# Patient Record
Sex: FEMALE | Race: WHITE | NOT HISPANIC OR LATINO | URBAN - METROPOLITAN AREA
[De-identification: names, ages, dates, MRNs, and addresses within clinical notes are randomized per-mention and may not be internally consistent; named-entity substitution may affect disease eponyms.]

---

## 2020-05-12 ENCOUNTER — TELEPHONE (OUTPATIENT)
Dept: OBSTETRICS AND GYNECOLOGY | Facility: CLINIC | Age: 25
End: 2020-05-12

## 2020-05-12 DIAGNOSIS — Z36.3 ENCOUNTER FOR ANTENATAL SCREENING FOR MALFORMATION USING ULTRASOUND: Primary | ICD-10-CM

## 2020-05-12 NOTE — TELEPHONE ENCOUNTER
----- Message from Louisa Suh sent at 5/12/2020  4:19 PM CDT -----  Can you have mfm put in orders for anatomy scan          Pt call back [phone]

## 2020-05-12 NOTE — TELEPHONE ENCOUNTER
Patient was contacted and informed orders will be placed once seen in office. Patient expressed understanding.

## 2020-05-20 ENCOUNTER — INITIAL PRENATAL (OUTPATIENT)
Dept: OBSTETRICS AND GYNECOLOGY | Facility: CLINIC | Age: 25
End: 2020-05-20
Payer: MEDICAID

## 2020-05-20 VITALS — SYSTOLIC BLOOD PRESSURE: 116 MMHG | DIASTOLIC BLOOD PRESSURE: 60 MMHG | WEIGHT: 238.13 LBS

## 2020-05-20 DIAGNOSIS — Z34.00 SUPERVISION OF NORMAL FIRST PREGNANCY, ANTEPARTUM: Primary | ICD-10-CM

## 2020-05-20 PROCEDURE — 99999 PR PBB SHADOW E&M-EST. PATIENT-LVL II: ICD-10-PCS | Mod: PBBFAC,,, | Performed by: NURSE PRACTITIONER

## 2020-05-20 PROCEDURE — 99202 OFFICE O/P NEW SF 15 MIN: CPT | Mod: TH,S$PBB,, | Performed by: NURSE PRACTITIONER

## 2020-05-20 PROCEDURE — 99999 PR PBB SHADOW E&M-EST. PATIENT-LVL II: CPT | Mod: PBBFAC,,, | Performed by: NURSE PRACTITIONER

## 2020-05-20 PROCEDURE — 99202 PR OFFICE/OUTPT VISIT, NEW, LEVL II, 15-29 MIN: ICD-10-PCS | Mod: TH,S$PBB,, | Performed by: NURSE PRACTITIONER

## 2020-05-20 PROCEDURE — 99212 OFFICE O/P EST SF 10 MIN: CPT | Mod: PBBFAC,TH | Performed by: NURSE PRACTITIONER

## 2020-05-20 PROCEDURE — 87086 URINE CULTURE/COLONY COUNT: CPT

## 2020-05-20 NOTE — PROGRESS NOTES
Pt presents as a transfer of care from [place]  at 20w0d, with no complaints.  She is a . Prior SAB X 1 with D&C. Denies any chronic medical issues or abdominal surgeries. Reports she had UTI in first trimester.  Reports + FM.  Denies VB and cramping.  Pain, bleeding, and PTL precautions given.  MFM anatomy scan ordered  Enrolled in [employer] [facility].   OB glucose at 24-28 weeks.   Release of records sent to obtain PN records.   F/U in 4 weeks

## 2020-05-22 ENCOUNTER — TELEPHONE (OUTPATIENT)
Dept: OBSTETRICS AND GYNECOLOGY | Facility: CLINIC | Age: 25
End: 2020-05-22

## 2020-05-22 LAB
BACTERIA UR CULT: NORMAL
BACTERIA UR CULT: NORMAL

## 2020-05-22 NOTE — TELEPHONE ENCOUNTER
----- Message from Kermit Hickman, Patient Care Assistant sent at 5/22/2020  2:32 PM CDT -----  Contact: RAO MAY [[id number]]  Name of Who is Calling: RAO MAY [[id number]]    What is the request in detail:Requesting to be scheduled with the mid wives.  Please contact to further discuss and advise      Can the clinic reply by [facility]: No    [facility]   [phone]       
Contacted patient and advised [facility] clinic will contact to schedule appointment.  
(3) slightly limited

## 2020-05-26 ENCOUNTER — PROCEDURE VISIT (OUTPATIENT)
Dept: MATERNAL FETAL MEDICINE | Facility: CLINIC | Age: 25
End: 2020-05-26
Payer: MEDICAID

## 2020-05-26 DIAGNOSIS — Z34.00 SUPERVISION OF NORMAL FIRST PREGNANCY, ANTEPARTUM: ICD-10-CM

## 2020-05-26 PROCEDURE — 76811 OB US DETAILED SNGL FETUS: CPT | Mod: 26,S$PBB,, | Performed by: PEDIATRICS

## 2020-05-26 PROCEDURE — 76811 PR US, OB FETAL EVAL & EXAM, TRANSABDOM,FIRST GESTATION: ICD-10-PCS | Mod: 26,S$PBB,, | Performed by: PEDIATRICS

## 2020-05-26 PROCEDURE — 76811 OB US DETAILED SNGL FETUS: CPT | Mod: PBBFAC | Performed by: PEDIATRICS

## 2020-06-01 ENCOUNTER — INITIAL PRENATAL (OUTPATIENT)
Dept: OBSTETRICS AND GYNECOLOGY | Facility: CLINIC | Age: 25
End: 2020-06-01
Payer: MEDICAID

## 2020-06-01 VITALS
BODY MASS INDEX: 42.52 KG/M2 | HEIGHT: 63 IN | WEIGHT: 240 LBS | SYSTOLIC BLOOD PRESSURE: 124 MMHG | DIASTOLIC BLOOD PRESSURE: 68 MMHG

## 2020-06-01 DIAGNOSIS — E66.09 CLASS 1 OBESITY DUE TO EXCESS CALORIES WITHOUT SERIOUS COMORBIDITY IN ADULT, UNSPECIFIED BMI: ICD-10-CM

## 2020-06-01 DIAGNOSIS — Z34.90 PREGNANCY WITH ONE FETUS, ANTEPARTUM: ICD-10-CM

## 2020-06-01 PROBLEM — E66.9 CLASS 1 OBESITY IN ADULT: Status: ACTIVE | Noted: 2020-06-01

## 2020-06-01 PROCEDURE — 99213 OFFICE O/P EST LOW 20 MIN: CPT | Mod: PBBFAC,TH | Performed by: ADVANCED PRACTICE MIDWIFE

## 2020-06-01 PROCEDURE — 99212 PR OFFICE/OUTPT VISIT, EST, LEVL II, 10-19 MIN: ICD-10-PCS | Mod: TH,S$PBB,, | Performed by: ADVANCED PRACTICE MIDWIFE

## 2020-06-01 PROCEDURE — 99212 OFFICE O/P EST SF 10 MIN: CPT | Mod: TH,S$PBB,, | Performed by: ADVANCED PRACTICE MIDWIFE

## 2020-06-01 PROCEDURE — 99999 PR PBB SHADOW E&M-EST. PATIENT-LVL III: ICD-10-PCS | Mod: PBBFAC,,, | Performed by: ADVANCED PRACTICE MIDWIFE

## 2020-06-01 PROCEDURE — 99999 PR PBB SHADOW E&M-EST. PATIENT-LVL III: CPT | Mod: PBBFAC,,, | Performed by: ADVANCED PRACTICE MIDWIFE

## 2020-06-01 NOTE — PROGRESS NOTES
Chief Complaint   Patient presents with    Initial Prenatal Visit       24 y.o., at 21w5d by Estimated Date of Delivery: 10/7/20 from first U/S    Complaints today: Here alone. Mom lives in town. Doing well.  Discussed diet and weight gain  FOB [place] cruise ship.    TW lbs    ROS  OBSTETRICS:   Contractions No   Bleeding No   Loss of fluid No   Fetal mvmnt yes  GASTRO:   Nausea No   Vomiting No      OB History    Para Term  AB Living   2       1     SAB TAB Ectopic Multiple Live Births   1              # Outcome Date GA Lbr Ish/2nd Weight Sex Delivery Anes PTL Lv   2 Current            1 SAB 2017              Complications: History of D&C       Dating reviewed  Allergies and problem list reviewed and updated  Medical and surgical history reviewed  Prenatal labs reviewed and updated    PHYSICAL EXAM  /68   Wt 108.8 kg (239 lb 15.5 oz)   LMP 2020     GENERAL: No acute distress  HEENT: Normocephalic, atraumatic  NEURO: Alert and oriented x3  PSYCH: Normal mood and affect  PULMONARY: Non-labored respiration; no tachypnea  ABD: Soft, gravid, nontender; no hernia or hepatosplenomegaly  FH = umbilicus    ASSESSMENT AND PLAN    pregnancy Problems (from 20 to present)     No problems associated with this episode.            Reviewed anatomy ultrasound had and will repeat 5wks  Reviewed wt gain parameters for ABC, along with exercise/diet recommendations in pregnancy.  Encouraged prenatal education classes - schedule given.  Education regarding  labor warning s/s.  Confirmed after-hours number given to patient.    Reviewed warning signs, normal FM, and how/when to call.    Follow-up: 4 weeks, call or present sooner PRN

## 2020-06-29 ENCOUNTER — ROUTINE PRENATAL (OUTPATIENT)
Dept: OBSTETRICS AND GYNECOLOGY | Facility: CLINIC | Age: 25
End: 2020-06-29
Payer: MEDICAID

## 2020-06-29 VITALS — DIASTOLIC BLOOD PRESSURE: 80 MMHG | SYSTOLIC BLOOD PRESSURE: 118 MMHG | WEIGHT: 244.38 LBS | BODY MASS INDEX: 43.3 KG/M2

## 2020-06-29 DIAGNOSIS — Z34.90 PREGNANCY WITH ONE FETUS, ANTEPARTUM: ICD-10-CM

## 2020-06-29 PROCEDURE — 99212 OFFICE O/P EST SF 10 MIN: CPT | Mod: TH,S$PBB,, | Performed by: ADVANCED PRACTICE MIDWIFE

## 2020-06-29 PROCEDURE — 99999 PR PBB SHADOW E&M-EST. PATIENT-LVL II: CPT | Mod: PBBFAC,,, | Performed by: ADVANCED PRACTICE MIDWIFE

## 2020-06-29 PROCEDURE — 99999 PR PBB SHADOW E&M-EST. PATIENT-LVL II: ICD-10-PCS | Mod: PBBFAC,,, | Performed by: ADVANCED PRACTICE MIDWIFE

## 2020-06-29 PROCEDURE — 99212 PR OFFICE/OUTPT VISIT, EST, LEVL II, 10-19 MIN: ICD-10-PCS | Mod: TH,S$PBB,, | Performed by: ADVANCED PRACTICE MIDWIFE

## 2020-06-29 PROCEDURE — 99212 OFFICE O/P EST SF 10 MIN: CPT | Mod: PBBFAC,TH | Performed by: ADVANCED PRACTICE MIDWIFE

## 2020-06-29 NOTE — PROGRESS NOTES
No chief complaint on file.      24 y.o. female  at 25w5d, by Estimated Date of Delivery: 10/7/20    Doing well today. Partner should be home in a month - reviewed diet / exercise recs. She is about to start working for [employer] and hoping to increase activity that way.   Reviewed TW lbs    ROS  OBSTETRICS:   Contractions No   Bleeding No   Loss of fluid No   Fetal mvmnt +  GASTRO:   Nausea No   Vomiting No      OB History    Para Term  AB Living   2       1     SAB TAB Ectopic Multiple Live Births   1              # Outcome Date GA Lbr Ish/2nd Weight Sex Delivery Anes PTL Lv   2 Current            1 SAB 2017              Complications: History of D&C       Dating reviewed  Allergies and problem list reviewed and updated  Medical and surgical history reviewed  Prenatal labs reviewed and updated    PHYSICAL EXAM  /80   Wt 110.9 kg (244 lb 6.1 oz)   LMP 2020   BMI 43.30 kg/m²     GENERAL: No acute distress  HEENT: Normocephalic, atraumatic  NEURO: Alert and oriented x3  PSYCH: Normal mood and affect  PULMONARY: Non-labored respiration; no tachypnea  ABD: Soft, gravid, nontender      ASSESSMENT AND PLAN    pregnancy Problems (from 20 to present)     No problems associated with this episode.            Reviewed upcoming 28wk labs, and we don't have records from [facility] of initial OB labs-orders placed  Education regarding warning signs of PreEclampsia, reviewed normal FM,  labor precautions, and how/when to call.    Follow-up: 4 weeks, call or present sooner PRN  Birth Center Risk Assessment: 0- Meets birth center guidelines    0- CNM management in ABC  1- CNM management on L&D  2- Consultation with OB to develop  plan of care  3- Collaborative CNM/OB management with delivery on L&D  Permanent referral of care to MD

## 2020-07-07 ENCOUNTER — LAB VISIT (OUTPATIENT)
Dept: LAB | Facility: OTHER | Age: 25
End: 2020-07-07
Attending: ADVANCED PRACTICE MIDWIFE
Payer: MEDICAID

## 2020-07-07 ENCOUNTER — PROCEDURE VISIT (OUTPATIENT)
Dept: MATERNAL FETAL MEDICINE | Facility: CLINIC | Age: 25
End: 2020-07-07
Payer: MEDICAID

## 2020-07-07 ENCOUNTER — ROUTINE PRENATAL (OUTPATIENT)
Dept: OBSTETRICS AND GYNECOLOGY | Facility: CLINIC | Age: 25
End: 2020-07-07
Payer: MEDICAID

## 2020-07-07 VITALS
WEIGHT: 246.56 LBS | BODY MASS INDEX: 43.69 KG/M2 | SYSTOLIC BLOOD PRESSURE: 126 MMHG | DIASTOLIC BLOOD PRESSURE: 78 MMHG

## 2020-07-07 DIAGNOSIS — Z34.90 PREGNANCY WITH ONE FETUS, ANTEPARTUM: Primary | ICD-10-CM

## 2020-07-07 DIAGNOSIS — Z34.90 PREGNANCY WITH ONE FETUS, ANTEPARTUM: ICD-10-CM

## 2020-07-07 LAB
ABO + RH BLD: NORMAL
BASOPHILS # BLD AUTO: 0.04 K/UL (ref 0–0.2)
BASOPHILS NFR BLD: 0.3 % (ref 0–1.9)
BLD GP AB SCN CELLS X3 SERPL QL: NORMAL
DIFFERENTIAL METHOD: ABNORMAL
EOSINOPHIL # BLD AUTO: 0.1 K/UL (ref 0–0.5)
EOSINOPHIL NFR BLD: 0.6 % (ref 0–8)
ERYTHROCYTE [DISTWIDTH] IN BLOOD BY AUTOMATED COUNT: 13 % (ref 11.5–14.5)
GLUCOSE SERPL-MCNC: 167 MG/DL (ref 70–140)
HCT VFR BLD AUTO: 35 % (ref 37–48.5)
HGB BLD-MCNC: 11.5 G/DL (ref 12–16)
IMM GRANULOCYTES # BLD AUTO: 0.11 K/UL (ref 0–0.04)
IMM GRANULOCYTES NFR BLD AUTO: 1 % (ref 0–0.5)
LYMPHOCYTES # BLD AUTO: 2.2 K/UL (ref 1–4.8)
LYMPHOCYTES NFR BLD: 18.8 % (ref 18–48)
MCH RBC QN AUTO: 29.8 PG (ref 27–31)
MCHC RBC AUTO-ENTMCNC: 32.9 G/DL (ref 32–36)
MCV RBC AUTO: 91 FL (ref 82–98)
MONOCYTES # BLD AUTO: 0.6 K/UL (ref 0.3–1)
MONOCYTES NFR BLD: 5 % (ref 4–15)
NEUTROPHILS # BLD AUTO: 8.5 K/UL (ref 1.8–7.7)
NEUTROPHILS NFR BLD: 74.3 % (ref 38–73)
NRBC BLD-RTO: 0 /100 WBC
PLATELET # BLD AUTO: 388 K/UL (ref 150–350)
PMV BLD AUTO: 10 FL (ref 9.2–12.9)
RBC # BLD AUTO: 3.86 M/UL (ref 4–5.4)
WBC # BLD AUTO: 11.47 K/UL (ref 3.9–12.7)

## 2020-07-07 PROCEDURE — 76816 OB US FOLLOW-UP PER FETUS: CPT | Mod: 26,S$PBB,, | Performed by: OBSTETRICS & GYNECOLOGY

## 2020-07-07 PROCEDURE — 86762 RUBELLA ANTIBODY: CPT

## 2020-07-07 PROCEDURE — 86850 RBC ANTIBODY SCREEN: CPT

## 2020-07-07 PROCEDURE — 76816 OB US FOLLOW-UP PER FETUS: CPT | Mod: PBBFAC | Performed by: OBSTETRICS & GYNECOLOGY

## 2020-07-07 PROCEDURE — 82950 GLUCOSE TEST: CPT

## 2020-07-07 PROCEDURE — 99212 OFFICE O/P EST SF 10 MIN: CPT | Mod: PBBFAC,TH,25 | Performed by: ADVANCED PRACTICE MIDWIFE

## 2020-07-07 PROCEDURE — 86592 SYPHILIS TEST NON-TREP QUAL: CPT

## 2020-07-07 PROCEDURE — 99999 PR PBB SHADOW E&M-EST. PATIENT-LVL II: CPT | Mod: PBBFAC,,, | Performed by: ADVANCED PRACTICE MIDWIFE

## 2020-07-07 PROCEDURE — 99213 PR OFFICE/OUTPT VISIT, EST, LEVL III, 20-29 MIN: ICD-10-PCS | Mod: TH,S$PBB,, | Performed by: ADVANCED PRACTICE MIDWIFE

## 2020-07-07 PROCEDURE — 86703 HIV-1/HIV-2 1 RESULT ANTBDY: CPT

## 2020-07-07 PROCEDURE — 76816 PR  US,PREGNANT UTERUS,F/U,TRANSABD APP: ICD-10-PCS | Mod: 26,S$PBB,, | Performed by: OBSTETRICS & GYNECOLOGY

## 2020-07-07 PROCEDURE — 36415 COLL VENOUS BLD VENIPUNCTURE: CPT

## 2020-07-07 PROCEDURE — 87340 HEPATITIS B SURFACE AG IA: CPT

## 2020-07-07 PROCEDURE — 99213 OFFICE O/P EST LOW 20 MIN: CPT | Mod: TH,S$PBB,, | Performed by: ADVANCED PRACTICE MIDWIFE

## 2020-07-07 PROCEDURE — 99999 PR PBB SHADOW E&M-EST. PATIENT-LVL II: ICD-10-PCS | Mod: PBBFAC,,, | Performed by: ADVANCED PRACTICE MIDWIFE

## 2020-07-07 PROCEDURE — 85025 COMPLETE CBC W/AUTO DIFF WBC: CPT

## 2020-07-07 NOTE — PROGRESS NOTES
Obstetrical ultrasound completed today.  See report in imaging section of [facility].

## 2020-07-07 NOTE — PROGRESS NOTES
No chief complaint on file.      24 y.o. female  at 26w6d, by Estimated Date of Delivery: 10/7/20    Complaints today: Mother here. Doing well today.   Hoping to have FOB at birth, but [place] not [place] the [place]  Will be mother if FOB not here.  Reviewed TW lbs    ROS  OBSTETRICS:   Contractions No   Bleeding No   Loss of fluid No   Fetal mvmnt yes  GASTRO:   Nausea No   Vomiting No      OB History    Para Term  AB Living   2       1     SAB TAB Ectopic Multiple Live Births   1              # Outcome Date GA Lbr Ish/2nd Weight Sex Delivery Anes PTL Lv   2 Current            1 SAB               Complications: History of D&C       Dating reviewed  Allergies and problem list reviewed and updated  Medical and surgical history reviewed  Prenatal labs reviewed and updated    PHYSICAL EXAM  /78   Wt 111.8 kg (246 lb 9.4 oz)   LMP 2020   BMI 43.69 kg/m²     GENERAL: No acute distress  HEENT: Normocephalic, atraumatic  NEURO: Alert and oriented x3  PSYCH: Normal mood and affect  PULMONARY: Non-labored respiration; no tachypnea  ABD: Soft, gravid, nontender      ASSESSMENT AND PLAN    pregnancy Problems (from 20 to present)     No problems associated with this episode.            Reviewed upcoming 28wk labs, (doing today) and orders placed  Education regarding warning signs of PreEclampsia denied today, reviewed normal FM,  labor precautions, and how/when to call.    Follow-up: 4 weeks, call or present sooner PRN

## 2020-07-08 LAB
HBV SURFACE AG SERPL QL IA: NEGATIVE
HIV 1+2 AB+HIV1 P24 AG SERPL QL IA: NEGATIVE
RPR SER QL: NORMAL
RUBV IGG SER-ACNC: 13.8 IU/ML
RUBV IGG SER-IMP: REACTIVE

## 2020-07-10 ENCOUNTER — TELEPHONE (OUTPATIENT)
Dept: OBSTETRICS AND GYNECOLOGY | Facility: HOSPITAL | Age: 25
End: 2020-07-10

## 2020-07-10 DIAGNOSIS — R73.09 ELEVATED GLUCOSE TOLERANCE TEST: Primary | ICD-10-CM

## 2020-07-12 ENCOUNTER — HOSPITAL ENCOUNTER (EMERGENCY)
Facility: OTHER | Age: 25
Discharge: HOME | End: 2020-07-12
Attending: OBSTETRICS & GYNECOLOGY
Payer: MEDICAID

## 2020-07-12 VITALS — HEART RATE: 103 BPM | SYSTOLIC BLOOD PRESSURE: 137 MMHG | DIASTOLIC BLOOD PRESSURE: 71 MMHG | OXYGEN SATURATION: 94 %

## 2020-07-12 DIAGNOSIS — Z3A.27 27 WEEKS GESTATION OF PREGNANCY: ICD-10-CM

## 2020-07-12 DIAGNOSIS — Z03.71 NO LEAKAGE OF AMNIOTIC FLUID INTO VAGINA: Primary | ICD-10-CM

## 2020-07-12 DIAGNOSIS — O16.3 ELEVATED BLOOD PRESSURE AFFECTING PREGNANCY IN THIRD TRIMESTER, ANTEPARTUM: ICD-10-CM

## 2020-07-12 LAB
ALBUMIN SERPL BCP-MCNC: 2.5 G/DL (ref 3.5–5.2)
ALP SERPL-CCNC: 98 U/L (ref 55–135)
ALT SERPL W/O P-5'-P-CCNC: 9 U/L (ref 10–44)
ANION GAP SERPL CALC-SCNC: 10 MMOL/L (ref 8–16)
AST SERPL-CCNC: 16 U/L (ref 10–40)
BASOPHILS # BLD AUTO: 0.04 K/UL (ref 0–0.2)
BASOPHILS NFR BLD: 0.4 % (ref 0–1.9)
BILIRUB SERPL-MCNC: 0.2 MG/DL (ref 0.1–1)
BUN SERPL-MCNC: 7 MG/DL (ref 6–20)
CALCIUM SERPL-MCNC: 8.7 MG/DL (ref 8.7–10.5)
CHLORIDE SERPL-SCNC: 109 MMOL/L (ref 95–110)
CO2 SERPL-SCNC: 19 MMOL/L (ref 23–29)
CREAT SERPL-MCNC: 0.7 MG/DL (ref 0.5–1.4)
CREAT UR-MCNC: 151.4 MG/DL (ref 15–325)
DIFFERENTIAL METHOD: ABNORMAL
EOSINOPHIL # BLD AUTO: 0.1 K/UL (ref 0–0.5)
EOSINOPHIL NFR BLD: 0.6 % (ref 0–8)
ERYTHROCYTE [DISTWIDTH] IN BLOOD BY AUTOMATED COUNT: 12.8 % (ref 11.5–14.5)
EST. GFR  (AFRICAN AMERICAN): >60 ML/MIN/1.73 M^2
EST. GFR  (NON AFRICAN AMERICAN): >60 ML/MIN/1.73 M^2
GLUCOSE SERPL-MCNC: 120 MG/DL (ref 70–110)
HCT VFR BLD AUTO: 33.2 % (ref 37–48.5)
HGB BLD-MCNC: 11.1 G/DL (ref 12–16)
IMM GRANULOCYTES # BLD AUTO: 0.03 K/UL (ref 0–0.04)
IMM GRANULOCYTES NFR BLD AUTO: 0.3 % (ref 0–0.5)
LYMPHOCYTES # BLD AUTO: 1.9 K/UL (ref 1–4.8)
LYMPHOCYTES NFR BLD: 16.7 % (ref 18–48)
MCH RBC QN AUTO: 30.2 PG (ref 27–31)
MCHC RBC AUTO-ENTMCNC: 33.4 G/DL (ref 32–36)
MCV RBC AUTO: 90 FL (ref 82–98)
MONOCYTES # BLD AUTO: 0.7 K/UL (ref 0.3–1)
MONOCYTES NFR BLD: 6.6 % (ref 4–15)
NEUTROPHILS # BLD AUTO: 8.4 K/UL (ref 1.8–7.7)
NEUTROPHILS NFR BLD: 75.4 % (ref 38–73)
NRBC BLD-RTO: 0 /100 WBC
PLATELET # BLD AUTO: 380 K/UL (ref 150–350)
PMV BLD AUTO: 9.9 FL (ref 9.2–12.9)
POTASSIUM SERPL-SCNC: 4.6 MMOL/L (ref 3.5–5.1)
PROT SERPL-MCNC: 6.6 G/DL (ref 6–8.4)
PROT UR-MCNC: 14 MG/DL (ref 0–15)
PROT/CREAT UR: 0.09 MG/G{CREAT} (ref 0–0.2)
RBC # BLD AUTO: 3.68 M/UL (ref 4–5.4)
SODIUM SERPL-SCNC: 138 MMOL/L (ref 136–145)
WBC # BLD AUTO: 11.13 K/UL (ref 3.9–12.7)

## 2020-07-12 PROCEDURE — 59025 FETAL NON-STRESS TEST: CPT | Mod: 26,,, | Performed by: OBSTETRICS & GYNECOLOGY

## 2020-07-12 PROCEDURE — 99284 EMERGENCY DEPT VISIT MOD MDM: CPT | Mod: 25,,, | Performed by: OBSTETRICS & GYNECOLOGY

## 2020-07-12 PROCEDURE — 82570 ASSAY OF URINE CREATININE: CPT

## 2020-07-12 PROCEDURE — 59025 PR FETAL 2N-STRESS TEST: ICD-10-PCS | Mod: 26,,, | Performed by: OBSTETRICS & GYNECOLOGY

## 2020-07-12 PROCEDURE — 85025 COMPLETE CBC W/AUTO DIFF WBC: CPT

## 2020-07-12 PROCEDURE — 99284 PR EMERGENCY DEPT VISIT,LEVEL IV: ICD-10-PCS | Mod: 25,,, | Performed by: OBSTETRICS & GYNECOLOGY

## 2020-07-12 PROCEDURE — 80053 COMPREHEN METABOLIC PANEL: CPT

## 2020-07-12 PROCEDURE — 59025 FETAL NON-STRESS TEST: CPT

## 2020-07-12 PROCEDURE — 99284 EMERGENCY DEPT VISIT MOD MDM: CPT | Mod: 25

## 2020-07-12 NOTE — ED PROVIDER NOTES
Encounter Date: 2020       History     Chief Complaint   Patient presents with    Rupture of Membranes     Elo Hays is a 24 y.o. [id number] at 27w4d presents complaining of LOF.   This IUP is complicated by obesity.  Patient denies contractions, denies vaginal bleeding, denies LOF.   Fetal Movement: normal.      Review of patient's allergies indicates:  No Known Allergies  No past medical history on file.  Past Surgical History:   Procedure Laterality Date    DILATION AND CURETTAGE OF UTERUS  2017     Family History   Problem Relation Age of Onset    Diabetes Paternal Grandfather     Diabetes Paternal Grandmother     Diabetes Maternal Grandfather     Heart attack Maternal Grandfather         X2,  from heart issues    No Known Problems Mother     No Known Problems Father     No Known Problems Brother     No Known Problems Maternal Grandmother      Social History     Tobacco Use    Smoking status: Never Smoker    Smokeless tobacco: Never Used   Substance Use Topics    Alcohol use: Not Currently     Frequency: Never     Comment: ocassionally    Drug use: Never     Review of Systems   Constitutional: Negative for chills and fever.   HENT: Negative for sore throat.    Eyes: Negative for visual disturbance.   Respiratory: Negative for shortness of breath.    Cardiovascular: Negative for chest pain and palpitations.   Gastrointestinal: Negative for diarrhea.   Genitourinary: Positive for vaginal bleeding. Negative for difficulty urinating.   Musculoskeletal: Negative for myalgias.   Skin: Negative for color change.   Neurological: Negative for dizziness, light-headedness and headaches.   Psychiatric/Behavioral: Negative for confusion.   All other systems reviewed and are negative.      Physical Exam     Initial Vitals   BP Pulse Resp Temp SpO2   20 1550 20 1547 -- -- 20 1548   138/85 (!) 119   95 %      MAP       --                Vitals:    20 1627 20 1629 20  1653 07/12/20 1654   BP: (!) 146/77      Pulse: 86 101 99 106   SpO2:    (!) 94%    07/12/20 1657 07/12/20 1658 07/12/20 1700 07/12/20 1701   BP:  130/67     Pulse: 98 94 100 94   SpO2:   (!) 94%     07/12/20 1709 07/12/20 1710 07/12/20 1712 07/12/20 1713   BP:   132/65    Pulse: 99 99 101 (!) 113   SpO2:  95%      07/12/20 1715 07/12/20 1720 07/12/20 1730   BP:   137/71   Pulse: 97 99 103   SpO2: (!) 94% (!) 94% (!) 94%       Physical Exam    Constitutional: She appears well-developed and well-nourished. No distress.   HENT:   Head: Normocephalic and atraumatic.   Eyes: EOM are normal.   Neck: Normal range of motion.   Cardiovascular: Normal rate.   Pulmonary/Chest: She has no wheezes.   Abdominal: Soft. There is no abdominal tenderness.   Musculoskeletal: Normal range of motion.   Neurological: She is alert and oriented to person, place, and time.   Skin: Skin is warm, dry and intact. No rash noted.   Psychiatric: She has a normal mood and affect. Her speech is normal and behavior is normal.     OB LABOR EXAM:   Pre-Term Labor: No.   Membranes ruptured: No.   Method: Sterile vaginal exam per MD and Sterile speculum exam per MD.   Vaginal Bleeding: none present.     Dilatation: 0.   Station: -5.     Amniotic Fluid Color: no fluid.           ED Course   Obtain Fetal nonstress test (NST)    Date/Time: 7/12/2020 4:00 PM  Performed by: Swapnil Rogers MD  Authorized by: Swapnil Rogers MD     Nonstress Test:     Variability:  6-25 BPM    Decelerations:  Variable    Accelerations:  10 bpm    Baseline:  160    Uterine Irritability: No      Contractions:  Not present  Biophysical Profile:     Nonstress Test Interpretation: reactive      Overall Impression:  Reassuring      Labs Reviewed   CBC W/ AUTO DIFFERENTIAL - Abnormal; Notable for the following components:       Result Value    RBC 3.68 (*)     Hemoglobin 11.1 (*)     Hematocrit 33.2 (*)     Platelets 380 (*)     Gran # (ANC) 8.4 (*)     Gran% 75.4 (*)     Lymph%  16.7 (*)     All other components within normal limits   COMPREHENSIVE METABOLIC PANEL - Abnormal; Notable for the following components:    CO2 19 (*)     Glucose 120 (*)     Albumin 2.5 (*)     ALT 9 (*)     All other components within normal limits   PROTEIN / CREATININE RATIO, URINE    Narrative:     Specimen Source->Urine          Imaging Results    None          Medical Decision Making:   ED Management:  23 yo  27 weeks r/o SROM  Denies any Pre E sx  NST reactive, reassuring  No signs of SROM on exam; cervix closed  Pre E labs drawn  Serial BP  Of note pt did fail 1 hour glucose. Alerted pt to contact CNM for 3 hour  Given mild range BP, pt now meets criteria for gHTN  Pt amenable to IOL. Consented, Vertex on US  Will alert CNM              Attending Attestation:   Physician Attestation Statement for Resident:  As the supervising MD   Physician Attestation Statement: I have personally seen and examined this patient.   I agree with the above history. -:   As the supervising MD I agree with the above PE.    As the supervising MD I agree with the above treatment, course, plan, and disposition.   -:   NST  I independently reviewed the fetal non-stress test with the following interpretation:  160 BPM baseline  Variability: moderate  Accelerations: present  Decelerations: absent  Contractions: none  Category 1    Clinical Interpretation:age appropriate    Patient evaluated and found to be stable, agree with resident's assessment of no leakage of amniotic fluid. Baseline PIH labs drawn due to borderline BP and early gestational age and plan to discharge with precautions re s/s  labor and pre-eclampsia, close follow up.  I was personally present during the critical portions of the procedure(s) performed by the resident and was immediately available in the ED to provide services and assistance as needed during the entire procedure.  I have reviewed and agree with the residents interpretation of the following:  lab data.  I have reviewed the following: old records at this facility.                                  Clinical Impression:       ICD-10-CM ICD-9-CM   1. No leakage of amniotic fluid into vagina  Z03.71 V89.01   2. Elevated blood pressure affecting pregnancy in third trimester, antepartum  O16.3 642.33   3. 27 weeks gestation of pregnancy  Z3A.27 V22.2         Disposition:   Disposition: Admitted  Condition: Stable     ED Disposition Condition    Discharge Stable        ED Prescriptions     None        Follow-up Information    None                     LEONCIO Rogers MD  OBGYN PGY2                Lalitha Cueva MD  07/14/20 1104

## 2020-07-12 NOTE — DISCHARGE INSTRUCTIONS
Call L&D at [phone].  Please return note any of the following leakage of fluid, decrease fetal movement, vaginal bleeding and contractions that are 3-5 mins apart lasting for one hour or more.

## 2020-07-28 ENCOUNTER — LAB VISIT (OUTPATIENT)
Dept: LAB | Facility: OTHER | Age: 25
End: 2020-07-28
Attending: ADVANCED PRACTICE MIDWIFE
Payer: MEDICAID

## 2020-07-28 ENCOUNTER — ROUTINE PRENATAL (OUTPATIENT)
Dept: OBSTETRICS AND GYNECOLOGY | Facility: CLINIC | Age: 25
End: 2020-07-28
Payer: MEDICAID

## 2020-07-28 VITALS
SYSTOLIC BLOOD PRESSURE: 120 MMHG | WEIGHT: 251.31 LBS | DIASTOLIC BLOOD PRESSURE: 66 MMHG | BODY MASS INDEX: 44.53 KG/M2

## 2020-07-28 DIAGNOSIS — Z34.90 PREGNANT AND NOT YET DELIVERED, UNSPECIFIED TRIMESTER: Primary | ICD-10-CM

## 2020-07-28 DIAGNOSIS — R73.09 ELEVATED GLUCOSE TOLERANCE TEST: ICD-10-CM

## 2020-07-28 LAB
GLUCOSE SERPL-MCNC: 113 MG/DL
GLUCOSE SERPL-MCNC: 167 MG/DL
GLUCOSE SERPL-MCNC: 169 MG/DL
GLUCOSE SERPL-MCNC: 93 MG/DL (ref 70–110)

## 2020-07-28 PROCEDURE — 99212 OFFICE O/P EST SF 10 MIN: CPT | Mod: TH,S$PBB,, | Performed by: ADVANCED PRACTICE MIDWIFE

## 2020-07-28 PROCEDURE — 99212 PR OFFICE/OUTPT VISIT, EST, LEVL II, 10-19 MIN: ICD-10-PCS | Mod: TH,S$PBB,, | Performed by: ADVANCED PRACTICE MIDWIFE

## 2020-07-28 PROCEDURE — 82952 GTT-ADDED SAMPLES: CPT

## 2020-07-28 PROCEDURE — 36415 COLL VENOUS BLD VENIPUNCTURE: CPT

## 2020-07-28 PROCEDURE — 99999 PR PBB SHADOW E&M-EST. PATIENT-LVL II: ICD-10-PCS | Mod: PBBFAC,,, | Performed by: ADVANCED PRACTICE MIDWIFE

## 2020-07-28 PROCEDURE — 99999 PR PBB SHADOW E&M-EST. PATIENT-LVL II: CPT | Mod: PBBFAC,,, | Performed by: ADVANCED PRACTICE MIDWIFE

## 2020-07-28 PROCEDURE — 99212 OFFICE O/P EST SF 10 MIN: CPT | Mod: PBBFAC,TH | Performed by: ADVANCED PRACTICE MIDWIFE

## 2020-07-28 PROCEDURE — 82951 GLUCOSE TOLERANCE TEST (GTT): CPT

## 2020-07-28 NOTE — PROGRESS NOTES
No chief complaint on file.      24 y.o. female  at 29w6d, by Estimated Date of Delivery: 10/7/20    Doing well today.  Reviewed TW lbs    ROS  OBSTETRICS:   Contractions No   Bleeding No   Loss of fluid No   Fetal mvmnt +  GASTRO:   Nausea No   Vomiting No      OB History    Para Term  AB Living   2       1     SAB TAB Ectopic Multiple Live Births   1              # Outcome Date GA Lbr Ish/2nd Weight Sex Delivery Anes PTL Lv   2 Current            1 2017              Complications: History of D&C       Dating reviewed  Allergies and problem list reviewed and updated  Medical and surgical history reviewed  Prenatal labs reviewed and updated    PHYSICAL EXAM  /66   Wt 114 kg (251 lb 5.2 oz)   LMP 2020   BMI 44.53 kg/m²     GENERAL: No acute distress  HEENT: Normocephalic, atraumatic  NEURO: Alert and oriented x3  PSYCH: Normal mood and affect  PULMONARY: Non-labored respiration; no tachypnea  ABD: Soft, gravid, nontender.      ASSESSMENT AND PLAN    pregnancy Problems (from 20 to present)     Problem Noted Resolved    Elevated glucose tolerance test 7/10/2020 by Rox Smith CNM No    Overview Signed 7/10/2020  5:16 PM by Rox Smith CNM     Ordered 3 hour.     Sent portal message and attempted calling pt -                Reviewed 28wk labs  Pt's partner back in [place] and hoping to join her before birth.   3 hour glucose today - pending.   ED diagnosed pt with gHTN during visit on  - BP normal since that time. Bringing to meeting.     Reviewed warning signs, normal FM,  labor precautions, and how/when to call.    Birth Center Risk Assessment: pending    0- CNM management in ABC  1- CNM management on L&D  2- Consultation with OB to develop  plan of care  3- Collaborative CNM/OB management with delivery on L&D  4- Permanent referral of care to MD      Follow-up: 2 weeks, call or present sooner PRN

## 2020-08-08 ENCOUNTER — TELEPHONE (OUTPATIENT)
Dept: OBSTETRICS AND GYNECOLOGY | Facility: HOSPITAL | Age: 25
End: 2020-08-08

## 2020-08-08 NOTE — TELEPHONE ENCOUNTER
Attempted to call patient back in reply to message re:  Swelling lets.  No answer and unable to leave voice mail.  Message via [place] sent.

## 2020-08-10 ENCOUNTER — DOCUMENTATION ONLY (OUTPATIENT)
Dept: OBSTETRICS AND GYNECOLOGY | Facility: CLINIC | Age: 25
End: 2020-08-10

## 2020-08-10 NOTE — PROGRESS NOTES
Date: 8/10/2020    Reviewed patient medical and obstetrical history with Dr. Arndt.     Patients History is significant for pt had jennifer on her problem list gHTN.  This was noted to on elevated bp in the ED.        Dr. Arndt recommendations for patient:  After reviewing the patient chart, no additional or previous elevated BP noted.  Problem removed from problem list.  Patient remains ABC candidate at this time.

## 2020-08-11 ENCOUNTER — ROUTINE PRENATAL (OUTPATIENT)
Dept: OBSTETRICS AND GYNECOLOGY | Facility: CLINIC | Age: 25
End: 2020-08-11
Payer: MEDICAID

## 2020-08-11 VITALS
BODY MASS INDEX: 44.81 KG/M2 | SYSTOLIC BLOOD PRESSURE: 122 MMHG | WEIGHT: 252.88 LBS | DIASTOLIC BLOOD PRESSURE: 70 MMHG

## 2020-08-11 DIAGNOSIS — Z34.90 PREGNANT AND NOT YET DELIVERED, UNSPECIFIED TRIMESTER: Primary | ICD-10-CM

## 2020-08-11 PROCEDURE — 99999 PR PBB SHADOW E&M-EST. PATIENT-LVL II: CPT | Mod: PBBFAC,,, | Performed by: ADVANCED PRACTICE MIDWIFE

## 2020-08-11 PROCEDURE — 99999 PR PBB SHADOW E&M-EST. PATIENT-LVL II: ICD-10-PCS | Mod: PBBFAC,,, | Performed by: ADVANCED PRACTICE MIDWIFE

## 2020-08-11 PROCEDURE — 99212 OFFICE O/P EST SF 10 MIN: CPT | Mod: PBBFAC,TH | Performed by: ADVANCED PRACTICE MIDWIFE

## 2020-08-11 PROCEDURE — 99213 PR OFFICE/OUTPT VISIT, EST, LEVL III, 20-29 MIN: ICD-10-PCS | Mod: S$PBB,TH,, | Performed by: ADVANCED PRACTICE MIDWIFE

## 2020-08-11 PROCEDURE — 99213 OFFICE O/P EST LOW 20 MIN: CPT | Mod: S$PBB,TH,, | Performed by: ADVANCED PRACTICE MIDWIFE

## 2020-08-11 NOTE — PROGRESS NOTES
Chief Complaint   Patient presents with    Routine Prenatal Visit       24 y.o. female  at 31w6d, by Estimated Date of Delivery: 10/7/20  Mother hx of pre-eclampsia --she is concerned about her swelling today. She is currently not working and states she does sit/ lay around during day sometimes.  Discussed increased risk for pre-e and warning s/s .  Reassured today regarding bp and mild swelling.  Complaints today: swelling. Doing well today.  Reviewed TW lbs--likely to delivery on labor and delivery  Patient ok with this    ROS  OBSTETRICS:   Contractions No   Bleeding No   Loss of fluid No   Fetal mvmnt present  GASTRO:   Nausea No   Vomiting No      OB History    Para Term  AB Living   2       1     SAB TAB Ectopic Multiple Live Births   1              # Outcome Date GA Lbr Ish/2nd Weight Sex Delivery Anes PTL Lv   2 Current            2017              Complications: History of D&C       Dating reviewed  Allergies and problem list reviewed and updated  Medical and surgical history reviewed  Prenatal labs reviewed and updated    PHYSICAL EXAM  /70   Wt 114.7 kg (252 lb 13.9 oz)   LMP 2020   BMI 44.81 kg/m²     GENERAL: No acute distress  HEENT: Normocephalic, atraumatic  NEURO: Alert and oriented x3  PSYCH: Normal mood and affect  PULMONARY: Non-labored respiration; no tachypnea  ABD: Soft, gravid, nontender.      ASSESSMENT AND PLAN    pregnancy Problems (from 20 to present)     Problem Noted Resolved    Elevated glucose tolerance test 7/10/2020 by Rox Smith CNM No    Overview Signed 7/10/2020  5:16 PM by Rox Smith CNM     Ordered 3 hour.     Sent portal message and attempted calling pt -                Patient plans to breast feed - reviewed breast feeding class here.  It's a BOY! They desire circumcision.  [facility]    Reviewed warning signs, normal FM,  labor precautions, and how/when to call.  Confirmed  pt has after-hours number.  Plans for mother to be at delivery--FOB is in [place]/ [facility] [employer] [facility] [employer] [facility] working and he is having trouble getting into [place] at this time with them not being . Patient is coping well with this situation and has good family support.  Follow-up: 2 weeks, call or present sooner ED Felipe CNM, MSN  08/11/2020  9:29 AM

## 2020-08-12 ENCOUNTER — IMMUNIZATION (OUTPATIENT)
Dept: PHARMACY | Facility: CLINIC | Age: 25
End: 2020-08-12
Payer: MEDICAID

## 2020-08-24 ENCOUNTER — ROUTINE PRENATAL (OUTPATIENT)
Dept: OBSTETRICS AND GYNECOLOGY | Facility: CLINIC | Age: 25
End: 2020-08-24
Payer: MEDICAID

## 2020-08-24 VITALS
DIASTOLIC BLOOD PRESSURE: 82 MMHG | BODY MASS INDEX: 45.65 KG/M2 | SYSTOLIC BLOOD PRESSURE: 122 MMHG | WEIGHT: 257.63 LBS

## 2020-08-24 DIAGNOSIS — Z34.90 PREGNANCY, UNSPECIFIED GESTATIONAL AGE: Primary | ICD-10-CM

## 2020-08-24 PROCEDURE — 99212 OFFICE O/P EST SF 10 MIN: CPT | Mod: PBBFAC,TH | Performed by: ADVANCED PRACTICE MIDWIFE

## 2020-08-24 PROCEDURE — 99213 PR OFFICE/OUTPT VISIT, EST, LEVL III, 20-29 MIN: ICD-10-PCS | Mod: TH,S$PBB,, | Performed by: ADVANCED PRACTICE MIDWIFE

## 2020-08-24 PROCEDURE — 99999 PR PBB SHADOW E&M-EST. PATIENT-LVL II: ICD-10-PCS | Mod: PBBFAC,,, | Performed by: ADVANCED PRACTICE MIDWIFE

## 2020-08-24 PROCEDURE — 99213 OFFICE O/P EST LOW 20 MIN: CPT | Mod: TH,S$PBB,, | Performed by: ADVANCED PRACTICE MIDWIFE

## 2020-08-24 PROCEDURE — 99999 PR PBB SHADOW E&M-EST. PATIENT-LVL II: CPT | Mod: PBBFAC,,, | Performed by: ADVANCED PRACTICE MIDWIFE

## 2020-08-24 NOTE — PROGRESS NOTES
Chief Complaint   Patient presents with    Routine Prenatal Visit       24 y.o. female  at 33w5d, by Estimated Date of Delivery: 10/7/20    Complaints today: Mother here. Doing well today.  FOB- [place]  Legs swelling when when laying down, thighs, goes down    Reviewed TW lbs    ROS  OBSTETRICS:   Contractions No   Bleeding No   Loss of fluid No   Fetal mvmnt Yes ++  GASTRO:   Nausea No   Vomiting No      OB History    Para Term  AB Living   2       1     SAB TAB Ectopic Multiple Live Births   1              # Outcome Date GA Lbr Ish/2nd Weight Sex Delivery Anes PTL Lv   2 Current            1  2017              Complications: History of D&C       Dating reviewed  Allergies and problem list reviewed and updated  Medical and surgical history reviewed  Prenatal labs reviewed and updated    PHYSICAL EXAM  /82   Wt 116.8 kg (257 lb 9.7 oz)   LMP 2020   BMI 45.65 kg/m²     GENERAL: No acute distress  HEENT: Normocephalic, atraumatic  NEURO: Alert and oriented x3  PSYCH: Normal mood and affect  PULMONARY: Non-labored respiration; no tachypnea  ABD: Soft, gravid, nontender.      ASSESSMENT AND PLAN    pregnancy Problems (from 20 to present)     Problem Noted Resolved    Elevated glucose tolerance test 7/10/2020 by Rox Smith CNM No    Overview Signed 7/10/2020  5:16 PM by Rox Smith CNM     Ordered 3 hour.     Sent portal message and attempted calling pt -                Reviewed upcoming 36wk labs.   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     Reviewed ABC risk assessment with the patient:    Birth Center Risk Assessment: 1-management on labor and Delivery    0- CNM management in ABC  1- CNM management on L&D  2- Consultation with OB to develop  plan of care  3- Collaborative CNM/OB management with delivery on L&D  4- Permanent referral of care to MD    She understands she is not a candidate for the ABC. Reviewed  potential risks which could arise, that could change this status.    Reviewed warning signs, normal FM,  labor precautions, and how/when to call. Confirmed pt has after-hours number.    Follow-up: 2 weeks, call or present sooner PRN

## 2020-09-01 ENCOUNTER — PROCEDURE VISIT (OUTPATIENT)
Dept: MATERNAL FETAL MEDICINE | Facility: CLINIC | Age: 25
End: 2020-09-01
Payer: MEDICAID

## 2020-09-01 DIAGNOSIS — Z34.90 PREGNANCY, UNSPECIFIED GESTATIONAL AGE: ICD-10-CM

## 2020-09-01 DIAGNOSIS — O99.210 MATERNAL OBESITY AFFECTING PREGNANCY, ANTEPARTUM: ICD-10-CM

## 2020-09-01 PROCEDURE — 76816 OB US FOLLOW-UP PER FETUS: CPT | Mod: 26,S$PBB,, | Performed by: OBSTETRICS & GYNECOLOGY

## 2020-09-01 PROCEDURE — 76819 PR US, OB, FETAL BIOPHYSICAL, W/O NST: ICD-10-PCS | Mod: 26,S$PBB,, | Performed by: OBSTETRICS & GYNECOLOGY

## 2020-09-01 PROCEDURE — 76819 FETAL BIOPHYS PROFIL W/O NST: CPT | Mod: PBBFAC | Performed by: OBSTETRICS & GYNECOLOGY

## 2020-09-01 PROCEDURE — 76816 PR  US,PREGNANT UTERUS,F/U,TRANSABD APP: ICD-10-PCS | Mod: 26,S$PBB,, | Performed by: OBSTETRICS & GYNECOLOGY

## 2020-09-01 PROCEDURE — 76816 OB US FOLLOW-UP PER FETUS: CPT | Mod: PBBFAC | Performed by: OBSTETRICS & GYNECOLOGY

## 2020-09-01 PROCEDURE — 76819 FETAL BIOPHYS PROFIL W/O NST: CPT | Mod: 26,S$PBB,, | Performed by: OBSTETRICS & GYNECOLOGY

## 2020-09-08 ENCOUNTER — ROUTINE PRENATAL (OUTPATIENT)
Dept: OBSTETRICS AND GYNECOLOGY | Facility: CLINIC | Age: 25
End: 2020-09-08
Payer: MEDICAID

## 2020-09-08 ENCOUNTER — LAB VISIT (OUTPATIENT)
Dept: LAB | Facility: OTHER | Age: 25
End: 2020-09-08
Attending: ADVANCED PRACTICE MIDWIFE
Payer: MEDICAID

## 2020-09-08 VITALS
BODY MASS INDEX: 46.31 KG/M2 | SYSTOLIC BLOOD PRESSURE: 120 MMHG | WEIGHT: 261.38 LBS | DIASTOLIC BLOOD PRESSURE: 84 MMHG

## 2020-09-08 DIAGNOSIS — Z34.90 PREGNANCY, UNSPECIFIED GESTATIONAL AGE: ICD-10-CM

## 2020-09-08 DIAGNOSIS — Z34.83 ENCOUNTER FOR SUPERVISION OF OTHER NORMAL PREGNANCY IN THIRD TRIMESTER: Primary | ICD-10-CM

## 2020-09-08 PROCEDURE — 86592 SYPHILIS TEST NON-TREP QUAL: CPT

## 2020-09-08 PROCEDURE — 99999 PR PBB SHADOW E&M-EST. PATIENT-LVL II: ICD-10-PCS | Mod: PBBFAC,,, | Performed by: ADVANCED PRACTICE MIDWIFE

## 2020-09-08 PROCEDURE — 99212 OFFICE O/P EST SF 10 MIN: CPT | Mod: PBBFAC,TH | Performed by: ADVANCED PRACTICE MIDWIFE

## 2020-09-08 PROCEDURE — 87081 CULTURE SCREEN ONLY: CPT

## 2020-09-08 PROCEDURE — 86703 HIV-1/HIV-2 1 RESULT ANTBDY: CPT

## 2020-09-08 PROCEDURE — 99213 PR OFFICE/OUTPT VISIT, EST, LEVL III, 20-29 MIN: ICD-10-PCS | Mod: S$PBB,TH,, | Performed by: ADVANCED PRACTICE MIDWIFE

## 2020-09-08 PROCEDURE — 99213 OFFICE O/P EST LOW 20 MIN: CPT | Mod: S$PBB,TH,, | Performed by: ADVANCED PRACTICE MIDWIFE

## 2020-09-08 PROCEDURE — 36415 COLL VENOUS BLD VENIPUNCTURE: CPT

## 2020-09-08 PROCEDURE — 99999 PR PBB SHADOW E&M-EST. PATIENT-LVL II: CPT | Mod: PBBFAC,,, | Performed by: ADVANCED PRACTICE MIDWIFE

## 2020-09-08 NOTE — PROGRESS NOTES
No chief complaint on file.      24 y.o. female  at 35w6d, by Estimated Date of Delivery: 10/7/20    Complaints today: daily BH . Doing well today.  Reviewed TW lbs    ROS  OBSTETRICS:   Contractions No   Bleeding No   Loss of fluid No   Fetal mvmnt yes  GASTRO:   Nausea No   Vomiting No      OB History    Para Term  AB Living   2       1     SAB TAB Ectopic Multiple Live Births   1              # Outcome Date GA Lbr Ish/2nd Weight Sex Delivery Anes PTL Lv   2 Current            1 2017              Complications: History of D&C       Dating reviewed  Allergies and problem list reviewed and updated  Medical and surgical history reviewed  Prenatal labs reviewed and updated    PHYSICAL EXAM  /84   Wt 118.6 kg (261 lb 5.7 oz)   LMP 2020   BMI 46.31 kg/m²     GENERAL: No acute distress  HEENT: Normocephalic, atraumatic  NEURO: Alert and oriented x3  PSYCH: Normal mood and affect  PULMONARY: Non-labored respiration; no tachypnea  ABD: Soft, gravid, nontender.      ASSESSMENT AND PLAN    pregnancy Problems (from 20 to present)     Problem Noted Resolved    Elevated glucose tolerance test 7/10/2020 by Rox Smith CNM No    Overview Signed 7/10/2020  5:16 PM by Rox Smith CNM     Ordered 3 hour.     Sent portal message and attempted calling pt -                GBS collected today   Reviewed [place] department of Health recommendation for repeat HIV/RPR today; she agrees      Reviewed warning signs, normal FM,  labor precautions, and how/when to call.      Follow-up: 1 week, call or present sooner PRN

## 2020-09-09 LAB
HIV 1+2 AB+HIV1 P24 AG SERPL QL IA: NEGATIVE
RPR SER QL: NORMAL

## 2020-09-10 LAB — BACTERIA SPEC AEROBE CULT: NORMAL

## 2020-09-11 ENCOUNTER — NURSE TRIAGE (OUTPATIENT)
Dept: ADMINISTRATIVE | Facility: CLINIC | Age: 25
End: 2020-09-11

## 2020-09-11 NOTE — TELEPHONE ENCOUNTER
"36 weeks pregnant and slight annoying back pain 1-2/10. Washita medina the past few days. Underwear were wet not sure if water is trickling out. A lot of mucus when she wiped. Sometimes abdominal tensing when nadira medina occur. Once a day and they has slowly started amping up a little bit. Pt stated the leakage has been more than 4 hours and she is still not sure if it is leakage but she had a lot of mucous when she wiped. Care advice recommend pt go to LD now. Pt verbalized understanding.     Reason for Disposition   Pregnant > 36 weeks (i.e., term) and having contractions or other symptoms of labor   Leakage of fluid from vagina and leakage started > 4 hours ago    Additional Information   Negative: Passed out (i.e., fainted, collapsed and was not responding)   Negative: Shock suspected (e.g., cold/pale/clammy skin, too weak to stand, low BP, rapid pulse)   Negative: Difficult to awaken or acting confused (e.g., disoriented, slurred speech)   Negative: SEVERE constant abdominal pain (e.g., excruciating) and present > 1 hour   Negative: SEVERE bleeding (e.g., continuous red blood from vagina, or large blood clots)   Negative: Uncontrollable urge to push (i.e., feels like baby is coming out now)   Negative: Umbilical cord hanging out of the vagina (shiny, white, curled appearance, "like telephone cord")   Negative: Can see baby   Negative: Sounds like a life-threatening emergency to the triager   Negative: MODERATE-SEVERE abdominal pain   Negative: First baby (primipara) with contractions < 6 minutes apart, and present 2 hours   Negative: History of prior delivery (multipara) with contractions < 10 minutes apart, and present 1 hour   Negative: History of rapid prior delivery with contractions < 10 minutes apart   Negative: Leakage of fluid from vagina and green or brown in color    Protocols used: PREGNANCY - VAGINAL CIDOIPDVL-W-DE, PREGNANCY - LABOR-A-OH      "

## 2020-09-15 ENCOUNTER — HOSPITAL ENCOUNTER (INPATIENT)
Facility: OTHER | Age: 25
LOS: 3 days | Discharge: HOME | End: 2020-09-18
Attending: OBSTETRICS & GYNECOLOGY | Admitting: OBSTETRICS & GYNECOLOGY
Payer: MEDICAID

## 2020-09-15 ENCOUNTER — HOSPITAL ENCOUNTER (OUTPATIENT)
Dept: PERINATAL CARE | Facility: OTHER | Age: 25
Discharge: HOME | End: 2020-09-15
Attending: ADVANCED PRACTICE MIDWIFE
Payer: MEDICAID

## 2020-09-15 ENCOUNTER — ANESTHESIA (OUTPATIENT)
Dept: OBSTETRICS AND GYNECOLOGY | Facility: OTHER | Age: 25
End: 2020-09-15
Payer: MEDICAID

## 2020-09-15 ENCOUNTER — ANESTHESIA EVENT (OUTPATIENT)
Dept: OBSTETRICS AND GYNECOLOGY | Facility: OTHER | Age: 25
End: 2020-09-15
Payer: MEDICAID

## 2020-09-15 ENCOUNTER — ROUTINE PRENATAL (OUTPATIENT)
Dept: OBSTETRICS AND GYNECOLOGY | Facility: CLINIC | Age: 25
End: 2020-09-15
Payer: MEDICAID

## 2020-09-15 ENCOUNTER — TELEPHONE (OUTPATIENT)
Dept: OBSTETRICS AND GYNECOLOGY | Facility: HOSPITAL | Age: 25
End: 2020-09-15

## 2020-09-15 VITALS
DIASTOLIC BLOOD PRESSURE: 82 MMHG | WEIGHT: 263.25 LBS | SYSTOLIC BLOOD PRESSURE: 142 MMHG | BODY MASS INDEX: 46.64 KG/M2

## 2020-09-15 DIAGNOSIS — O16.3 ELEVATED BLOOD PRESSURE AFFECTING PREGNANCY IN THIRD TRIMESTER, ANTEPARTUM: ICD-10-CM

## 2020-09-15 DIAGNOSIS — Z98.891 S/P CESAREAN SECTION: ICD-10-CM

## 2020-09-15 DIAGNOSIS — O13.3 GESTATIONAL HYPERTENSION, THIRD TRIMESTER: Primary | ICD-10-CM

## 2020-09-15 DIAGNOSIS — Z34.90 ENCOUNTER FOR INDUCTION OF LABOR: ICD-10-CM

## 2020-09-15 DIAGNOSIS — O16.3 ELEVATED BLOOD PRESSURE AFFECTING PREGNANCY IN THIRD TRIMESTER, ANTEPARTUM: Primary | ICD-10-CM

## 2020-09-15 DIAGNOSIS — Z34.90 PREGNANCY WITH ONE FETUS, ANTEPARTUM: Primary | ICD-10-CM

## 2020-09-15 DIAGNOSIS — Z3A.36 36 WEEKS GESTATION OF PREGNANCY: ICD-10-CM

## 2020-09-15 LAB
ABO + RH BLD: NORMAL
BASOPHILS # BLD AUTO: 0.05 K/UL (ref 0–0.2)
BASOPHILS NFR BLD: 0.3 % (ref 0–1.9)
BLD GP AB SCN CELLS X3 SERPL QL: NORMAL
CREAT UR-MCNC: 110.7 MG/DL (ref 15–325)
CREAT UR-MCNC: 91 MG/DL (ref 15–325)
DIFFERENTIAL METHOD: ABNORMAL
EOSINOPHIL # BLD AUTO: 0 K/UL (ref 0–0.5)
EOSINOPHIL NFR BLD: 0.3 % (ref 0–8)
ERYTHROCYTE [DISTWIDTH] IN BLOOD BY AUTOMATED COUNT: 12.9 % (ref 11.5–14.5)
HCT VFR BLD AUTO: 33.1 % (ref 37–48.5)
HGB BLD-MCNC: 10.9 G/DL (ref 12–16)
IMM GRANULOCYTES # BLD AUTO: 0.07 K/UL (ref 0–0.04)
IMM GRANULOCYTES NFR BLD AUTO: 0.4 % (ref 0–0.5)
LYMPHOCYTES # BLD AUTO: 3.1 K/UL (ref 1–4.8)
LYMPHOCYTES NFR BLD: 19.5 % (ref 18–48)
MCH RBC QN AUTO: 28.5 PG (ref 27–31)
MCHC RBC AUTO-ENTMCNC: 32.9 G/DL (ref 32–36)
MCV RBC AUTO: 87 FL (ref 82–98)
MONOCYTES # BLD AUTO: 1.2 K/UL (ref 0.3–1)
MONOCYTES NFR BLD: 7.3 % (ref 4–15)
NEUTROPHILS # BLD AUTO: 11.5 K/UL (ref 1.8–7.7)
NEUTROPHILS NFR BLD: 72.2 % (ref 38–73)
NRBC BLD-RTO: 0 /100 WBC
PLATELET # BLD AUTO: 428 K/UL (ref 150–350)
PMV BLD AUTO: 10.3 FL (ref 9.2–12.9)
PROT UR-MCNC: 12 MG/DL (ref 0–15)
PROT UR-MCNC: 22 MG/DL (ref 0–15)
PROT/CREAT UR: 0.13 MG/G{CREAT} (ref 0–0.2)
PROT/CREAT UR: 0.2 MG/G{CREAT} (ref 0–0.2)
RBC # BLD AUTO: 3.82 M/UL (ref 4–5.4)
SARS-COV-2 RDRP RESP QL NAA+PROBE: NEGATIVE
WBC # BLD AUTO: 15.93 K/UL (ref 3.9–12.7)

## 2020-09-15 PROCEDURE — 11000001 HC ACUTE MED/SURG PRIVATE ROOM

## 2020-09-15 PROCEDURE — 86901 BLOOD TYPING SEROLOGIC RH(D): CPT

## 2020-09-15 PROCEDURE — 99213 OFFICE O/P EST LOW 20 MIN: CPT | Mod: 25,TH,S$PBB, | Performed by: ADVANCED PRACTICE MIDWIFE

## 2020-09-15 PROCEDURE — 72100002 HC LABOR CARE, 1ST 8 HOURS

## 2020-09-15 PROCEDURE — 99213 PR OFFICE/OUTPT VISIT, EST, LEVL III, 20-29 MIN: ICD-10-PCS | Mod: 25,TH,S$PBB, | Performed by: ADVANCED PRACTICE MIDWIFE

## 2020-09-15 PROCEDURE — 85025 COMPLETE CBC W/AUTO DIFF WBC: CPT | Mod: 91

## 2020-09-15 PROCEDURE — 99283 EMERGENCY DEPT VISIT LOW MDM: CPT | Mod: 25,,, | Performed by: ADVANCED PRACTICE MIDWIFE

## 2020-09-15 PROCEDURE — U0002 COVID-19 LAB TEST NON-CDC: HCPCS

## 2020-09-15 PROCEDURE — 59025 FETAL NON-STRESS TEST: CPT | Mod: 26,,, | Performed by: OBSTETRICS & GYNECOLOGY

## 2020-09-15 PROCEDURE — 76815 OB US LIMITED FETUS(S): CPT

## 2020-09-15 PROCEDURE — 63600175 PHARM REV CODE 636 W HCPCS: Performed by: ADVANCED PRACTICE MIDWIFE

## 2020-09-15 PROCEDURE — 76815 OB US LIMITED FETUS(S): CPT | Mod: 26,,, | Performed by: OBSTETRICS & GYNECOLOGY

## 2020-09-15 PROCEDURE — 99999 PR PBB SHADOW E&M-EST. PATIENT-LVL II: ICD-10-PCS | Mod: PBBFAC,,, | Performed by: ADVANCED PRACTICE MIDWIFE

## 2020-09-15 PROCEDURE — 59025 FETAL NON-STRESS TEST: CPT | Mod: 26,,, | Performed by: ADVANCED PRACTICE MIDWIFE

## 2020-09-15 PROCEDURE — 87491 CHLMYD TRACH DNA AMP PROBE: CPT

## 2020-09-15 PROCEDURE — 59025 PR FETAL 2N-STRESS TEST: ICD-10-PCS | Mod: 26,,, | Performed by: OBSTETRICS & GYNECOLOGY

## 2020-09-15 PROCEDURE — 99285 EMERGENCY DEPT VISIT HI MDM: CPT | Mod: 25,27

## 2020-09-15 PROCEDURE — 59200 INSERT CERVICAL DILATOR: CPT

## 2020-09-15 PROCEDURE — 84156 ASSAY OF PROTEIN URINE: CPT | Mod: 91

## 2020-09-15 PROCEDURE — 84156 ASSAY OF PROTEIN URINE: CPT

## 2020-09-15 PROCEDURE — 76815 PR  US,PREGNANT UTERUS,LIMITED, 1/> FETUSES: ICD-10-PCS | Mod: 26,,, | Performed by: OBSTETRICS & GYNECOLOGY

## 2020-09-15 PROCEDURE — 99212 OFFICE O/P EST SF 10 MIN: CPT | Mod: PBBFAC,TH,25 | Performed by: ADVANCED PRACTICE MIDWIFE

## 2020-09-15 PROCEDURE — 59025 FETAL NON-STRESS TEST: CPT | Mod: 76

## 2020-09-15 PROCEDURE — 99283 PR EMERGENCY DEPT VISIT,LEVEL III: ICD-10-PCS | Mod: 25,,, | Performed by: ADVANCED PRACTICE MIDWIFE

## 2020-09-15 PROCEDURE — 59025 OBTAIN FETAL NONSTRESS TEST (NST): ICD-10-PCS | Mod: 26,,, | Performed by: ADVANCED PRACTICE MIDWIFE

## 2020-09-15 PROCEDURE — 99999 PR PBB SHADOW E&M-EST. PATIENT-LVL II: CPT | Mod: PBBFAC,,, | Performed by: ADVANCED PRACTICE MIDWIFE

## 2020-09-15 PROCEDURE — 59025 FETAL NON-STRESS TEST: CPT

## 2020-09-15 RX ORDER — OXYTOCIN/RINGER'S LACTATE 30/500 ML
2 PLASTIC BAG, INJECTION (ML) INTRAVENOUS CONTINUOUS
Status: DISCONTINUED | OUTPATIENT
Start: 2020-09-15 | End: 2020-09-16

## 2020-09-15 RX ORDER — SODIUM CHLORIDE 9 MG/ML
INJECTION, SOLUTION INTRAVENOUS
Status: DISCONTINUED | OUTPATIENT
Start: 2020-09-15 | End: 2020-09-16

## 2020-09-15 RX ORDER — OXYTOCIN/RINGER'S LACTATE 30/500 ML
95 PLASTIC BAG, INJECTION (ML) INTRAVENOUS ONCE
Status: DISCONTINUED | OUTPATIENT
Start: 2020-09-15 | End: 2020-09-16

## 2020-09-15 RX ORDER — BUTORPHANOL TARTRATE 1 MG/ML
1 INJECTION INTRAMUSCULAR; INTRAVENOUS ONCE
Status: COMPLETED | OUTPATIENT
Start: 2020-09-15 | End: 2020-09-15

## 2020-09-15 RX ORDER — ONDANSETRON 8 MG/1
8 TABLET, ORALLY DISINTEGRATING ORAL EVERY 8 HOURS PRN
Status: DISCONTINUED | OUTPATIENT
Start: 2020-09-15 | End: 2020-09-16

## 2020-09-15 RX ORDER — SIMETHICONE 80 MG
1 TABLET,CHEWABLE ORAL 4 TIMES DAILY PRN
Status: DISCONTINUED | OUTPATIENT
Start: 2020-09-15 | End: 2020-09-16

## 2020-09-15 RX ORDER — CEFAZOLIN SODIUM 1 G/50ML
SOLUTION INTRAVENOUS
Status: DISPENSED
Start: 2020-09-15 | End: 2020-09-16

## 2020-09-15 RX ORDER — OXYTOCIN/RINGER'S LACTATE 30/500 ML
334 PLASTIC BAG, INJECTION (ML) INTRAVENOUS ONCE
Status: DISCONTINUED | OUTPATIENT
Start: 2020-09-15 | End: 2020-09-16

## 2020-09-15 RX ORDER — CALCIUM CARBONATE 200(500)MG
500 TABLET,CHEWABLE ORAL 3 TIMES DAILY PRN
Status: DISCONTINUED | OUTPATIENT
Start: 2020-09-15 | End: 2020-09-16

## 2020-09-15 RX ORDER — SODIUM CHLORIDE, SODIUM LACTATE, POTASSIUM CHLORIDE, CALCIUM CHLORIDE 600; 310; 30; 20 MG/100ML; MG/100ML; MG/100ML; MG/100ML
INJECTION, SOLUTION INTRAVENOUS CONTINUOUS
Status: DISCONTINUED | OUTPATIENT
Start: 2020-09-15 | End: 2020-09-16

## 2020-09-15 RX ADMIN — Medication 2 MILLI-UNITS/MIN: at 06:09

## 2020-09-15 RX ADMIN — SODIUM CHLORIDE, SODIUM LACTATE, POTASSIUM CHLORIDE, AND CALCIUM CHLORIDE: .6; .31; .03; .02 INJECTION, SOLUTION INTRAVENOUS at 06:09

## 2020-09-15 RX ADMIN — BUTORPHANOL TARTRATE 1 MG: 1 INJECTION, SOLUTION INTRAMUSCULAR; INTRAVENOUS at 06:09

## 2020-09-15 NOTE — PROGRESS NOTES
No chief complaint on file.      24 y.o. female  at 36w6d, by Estimated Date of Delivery: 10/7/20    Complaints today: Mother here. Doing well today.  Has headaches random brief sinus, not even daily. No visual changes, or epigastric  Initial /82, repeat after 15 min. 144/86  Pre-E labs ordered stat, labs in normal range  Discussed client with Dr Kearns. He wanted an NST today and U/S end of the week. Both ordered and client informed.  Updated A Champagne CNM, on call midwife to f/u     Reviewed TW lbs    ROS  OBSTETRICS:   Contractions No   Bleeding No   Loss of fluid No   Fetal mvmnt yes  GASTRO:   Nausea No   Vomiting No      OB History    Para Term  AB Living   2       1     SAB TAB Ectopic Multiple Live Births   1              # Outcome Date GA Lbr Ish/2nd Weight Sex Delivery Anes PTL Lv   2 Current            1 2017              Complications: History of D&C       Dating reviewed  Allergies and problem list reviewed and updated  Medical and surgical history reviewed  Prenatal labs reviewed and updated    PHYSICAL EXAM  LMP 2020     GENERAL: No acute distress  HEENT: Normocephalic, atraumatic  NEURO: Alert and oriented x3  PSYCH: Normal mood and affect  PULMONARY: Non-labored respiration; no tachypnea  ABD: Soft, gravid, nontender.      ASSESSMENT AND PLAN    pregnancy Problems (from 18 to present)     Problem Noted Resolved    Elevated glucose tolerance test 7/10/2020 by Rox Smith CNM No    Overview Signed 7/10/2020  5:16 PM by Rox Smith CNM     Ordered 3 hour.     Sent portal message and attempted calling pt -                GBS neg  Reviewed [place] department of [facility] recommendation for repeat HIV/RPR neg/neg       Reviewed warning signs, normal FM,  labor precautions, and how/when to call.      Follow-up: 1 week, call or present sooner PRN

## 2020-09-15 NOTE — ANESTHESIA PREPROCEDURE EVALUATION
[facility]  Anesthesia Pre-Operative Evaluation         Patient Name: Elo Hays  YOB: 1995  MRN: [id number]    09/15/2020      Elo Hays is a 24 y.o. female  @ 36w6d who presents to [facility] with elevated blood pressure in PNT. Denies headache, SOB, RUQ pain, edema, vision changes. States that she has been feeling contractions for some time now, but that they are getting stronger and closer together. Pregnancy complicated by elevated 1 hr glucose (normal 3 hour) and gHTN. Other PMH includes obesity (BMI 47), previous D&C, and prior SAB. Patient is interested in epidural for delivery. She denies hx of stroke, seizures, CAD, CHF, HTN, asthma, COPD, GERD, dysuria, bleeding disorders, anticoagulant use, spinal disorders, or back surgeries.     OB History    Para Term  AB Living   2       1     SAB TAB Ectopic Multiple Live Births   1              # Outcome Date GA Lbr Ish/2nd Weight Sex Delivery Anes PTL Lv   2 Current            1 SAB               Complications: History of D&C       Review of patient's allergies indicates:  No Known Allergies    Wt Readings from Last 1 Encounters:   09/15/20 0829 119.4 kg (263 lb 3.7 oz)       BP Readings from Last 3 Encounters:   09/15/20 (!) 117/56   09/15/20 (!) 142/82   20 120/84       Patient Active Problem List   Diagnosis    Pregnancy with one fetus, antepartum    Class 1 obesity in adult    Elevated glucose tolerance test    Gestational hypertension, third trimester       Past Surgical History:   Procedure Laterality Date    DILATION AND CURETTAGE OF UTERUS  2017       Social History     Socioeconomic History    Marital status: Single     Spouse name: SLIM Persaud    Number of children: 0    Years of education: 15    Highest education level: High school graduate   Occupational History    Occupation: [employer]     Comment: currently out of work   Social Needs    Financial resource  strain: Not hard at all    Food insecurity     Worry: Never true     Inability: Never true    Transportation needs     Medical: No     Non-medical: No   Tobacco Use    Smoking status: Never Smoker    Smokeless tobacco: Never Used   Substance and Sexual Activity    Alcohol use: Not Currently     Frequency: Never     Comment: ocassionally    Drug use: Never    Sexual activity: Yes     Partners: Male   Lifestyle    Physical activity     Days per week: 2 days     Minutes per session: 30 min    Stress: Only a little   Relationships    Social connections     Talks on phone: Not on file     Gets together: Not on file     Attends Restoration service: Not on file     Active member of club or organization: Not on file     Attends meetings of clubs or organizations: Not on file     Relationship status: Not on file   Other Topics Concern    Not on file   Social History Narrative    FOB in from [place], met on ship, 7 months ( 6/1/2020- today )         Chemistry        Component Value Date/Time     09/15/2020 0921    K 4.2 09/15/2020 0921     09/15/2020 0921    CO2 17 (L) 09/15/2020 0921    BUN 6 09/15/2020 0921    CREATININE 0.7 09/15/2020 0921     (H) 09/15/2020 0921        Component Value Date/Time    CALCIUM 9.4 09/15/2020 0921    ALKPHOS 150 (H) 09/15/2020 0921    AST 12 09/15/2020 0921    ALT 10 09/15/2020 0921    BILITOT 0.2 09/15/2020 0921    ESTGFRAFRICA >60 09/15/2020 0921    EGFRNONAA >60 09/15/2020 0921            Lab Results   Component Value Date    WBC 13.57 (H) 09/15/2020    HGB 11.3 (L) 09/15/2020    HCT 33.6 (L) 09/15/2020    MCV 86 09/15/2020     (H) 09/15/2020       No results for input(s): PT, INR, PROTIME, APTT in the last 72 hours.      Anesthesia Evaluation    I have reviewed the Patient Summary Reports.      I have reviewed the Medications.     Review of Systems  Anesthesia Hx:  No problems with previous Anesthesia  Denies Family Hx of Anesthesia complications.    Denies Personal Hx of Anesthesia complications.   Social:  Non-Smoker    Hematology/Oncology:  Hematology Normal   Oncology Normal     EENT/Dental:EENT/Dental Normal   Cardiovascular:   Hypertension (gestational)    Pulmonary:  Pulmonary Normal    Renal/:  Renal/ Normal     Hepatic/GI:  Hepatic/GI Normal    Neurological:  Neurology Normal    Endocrine:  Endocrine Normal    Psych:  Psychiatric Normal           Physical Exam  General:  Well nourished, Morbid Obesity    Airway/Jaw/Neck:  Airway Findings: Mouth Opening: Normal Tongue: Normal  General Airway Assessment: Adult  Mallampati: III  Jaw/Neck Findings:  Neck ROM: Normal ROM      Dental:  Dental Findings: In tact   Chest/Lungs:  Chest/Lungs Findings: Clear to auscultation, Normal Respiratory Rate     Heart/Vascular:  Heart Findings: Rate: Normal  Rhythm: Regular Rhythm     Abdomen:  Abdomen Findings:  Normal     Musculoskeletal:  Musculoskeletal Findings: Normal   Skin:  Skin Findings: Normal    Mental Status:  Mental Status Findings:  Cooperative, Alert and Oriented         Anesthesia Plan  Type of Anesthesia, risks & benefits discussed:  Anesthesia Type:  epidural, general, spinal, CSE  Patient's Preference:   Intra-op Monitoring Plan: standard ASA monitors  Intra-op Monitoring Plan Comments:   Post Op Pain Control Plan: multimodal analgesia and per primary service following discharge from PACU  Post Op Pain Control Plan Comments:   Induction:   IV  Beta Blocker:  Patient is not currently on a Beta-Blocker (No further documentation required).       Informed Consent: Patient understands risks and agrees with Anesthesia plan.  Questions answered. Anesthesia consent signed with patient.  ASA Score: 2     Day of Surgery Review of History & Physical: I have interviewed and examined the patient. I have reviewed the patient's H&P dated:    H&P update referred to the provider.         Ready For Surgery From Anesthesia Perspective.

## 2020-09-15 NOTE — PROGRESS NOTES
[place] balloon placed per JANIS Land. 30 cc's sterile saline inflated into port. Patient tolerated well.

## 2020-09-15 NOTE — ED PROVIDER NOTES
Encounter Date: 9/15/2020       History     Chief Complaint   Patient presents with    Hypertension     HPI   Elo Hays is a 24 y.o. [id number] at 36w6d presents with elevated blood pressure in PNT. Denies headache, SOB, RUQ pain, edema, vision changes. States that she has been feeling contractions for some time now, but that they are getting stronger and closer together.  This IUP is complicated by elevated 1 hr (normal 3 hour) and gHTN. Patient reports contractions, denies vaginal bleeding, denies LOF.   Fetal Movement: normal.       Review of patient's allergies indicates:  No Known Allergies  No past medical history on file.  Past Surgical History:   Procedure Laterality Date    DILATION AND CURETTAGE OF UTERUS  2017     Family History   Problem Relation Age of Onset    Diabetes Paternal Grandfather     Diabetes Paternal Grandmother     Diabetes Maternal Grandfather     Heart attack Maternal Grandfather         X2,  from heart issues    No Known Problems Mother     No Known Problems Father     No Known Problems Brother     No Known Problems Maternal Grandmother      Social History     Tobacco Use    Smoking status: Never Smoker    Smokeless tobacco: Never Used   Substance Use Topics    Alcohol use: Not Currently     Frequency: Never     Comment: ocassionally    Drug use: Never     Review of Systems   Constitutional: Negative for fever.   HENT: Negative for sore throat.    Respiratory: Negative for shortness of breath.    Cardiovascular: Negative for chest pain.   Gastrointestinal: Negative for nausea.   Genitourinary: Negative for dysuria.   Musculoskeletal: Negative for back pain.   Skin: Negative for rash.   Neurological: Negative for weakness.   Hematological: Does not bruise/bleed easily.       Physical Exam     Initial Vitals [09/15/20 1436]   BP Pulse Resp Temp SpO2   (!) 142/80 97 -- -- 98 %      MAP       --         Physical Exam    Vitals reviewed.  Constitutional: She appears  well-developed and well-nourished. She is not diaphoretic. No distress.   HENT:   Head: Normocephalic and atraumatic.   Eyes: Conjunctivae and EOM are normal.   Neck: Normal range of motion. Neck supple.   Cardiovascular: Normal rate.   Pulmonary/Chest:   Normal work of breathing   Abdominal: Soft. There is no abdominal tenderness. There is no rebound and no guarding.   Gravid uterus   Musculoskeletal: Normal range of motion.   Neurological: She is alert and oriented to person, place, and time.   Skin: Skin is warm and dry.   Psychiatric: She has a normal mood and affect. Thought content normal.     OB LABOR EXAM:     Membranes ruptured: No.   Method: Sterile vaginal exam per MD.       Dilatation: 1.   Station: -3.   Effacement: 60%.             ED Course   Obtain Fetal nonstress test (NST)    Date/Time: 9/15/2020 3:33 PM  Performed by: Anais Loco MD  Authorized by: Anais Loco MD     Nonstress Test:     Variability:  6-25 BPM    Decelerations:  Late    Accelerations:  15 bpm    Acoustic Stimulator: No      Baseline:  150    Uterine Irritability: Yes      Contractions:  Irregular    Contraction Frequency:  Q2-5 mins  Biophysical Profile:     Nonstress Test Interpretation: reactive        Labs Reviewed   PROTEIN / CREATININE RATIO, URINE   SARS-COV-2 RNA AMPLIFICATION, QUAL          Imaging Results    None          Medical Decision Making:   ED Management:  - mild range blood pressures  - CBC, CMP drawn this AM wnl  - UPC pending  - [place]: ctx q2-5 mins  - FHT: reactive, recurrent late decels, moderate variability  - Admit to Labor and Delivery unit  - Consents for delivery including vaginal or  section and blood transfusion signed and to chart  - Risks, benefits, alternatives and possible complications have been discussed in detail with the patient.   - Epidural per Anesthesia  - Draw CBC, T&S  - Notify midwife  - Post-Partum Hemorrhage risk - low  1550 /-3 soft, posterior, cephalic  Discussed pt  case with Dr. Montalvo. Will admit to L&D for IOL d/t cat 2 fetal heart rate tracing and gestational HTN (protein/creatinine ratio not resulted yet).                               Clinical Impression:   Final diagnoses:  [O13.3] Gestational hypertension, third trimester (Primary)  [Z3A.36] 36 weeks gestation of pregnancy

## 2020-09-15 NOTE — TELEPHONE ENCOUNTER
Spoke with Elo's mother and let her know that Elo has an appointment at the prenatal testing clinic at 1pm today. Gave directions to the clinic, and she states she will let Elo know and she will come with her to that appointment.

## 2020-09-16 PROBLEM — Z34.90 ENCOUNTER FOR INDUCTION OF LABOR: Status: RESOLVED | Noted: 2020-09-15 | Resolved: 2020-09-16

## 2020-09-16 PROBLEM — Z98.891 S/P CESAREAN SECTION: Status: ACTIVE | Noted: 2020-09-16

## 2020-09-16 PROCEDURE — C1751 CATH, INF, PER/CENT/MIDLINE: HCPCS | Performed by: ANESTHESIOLOGY

## 2020-09-16 PROCEDURE — 63600175 PHARM REV CODE 636 W HCPCS: Performed by: ANESTHESIOLOGY

## 2020-09-16 PROCEDURE — 59514 PR CESAREAN DELIVERY ONLY: ICD-10-PCS | Mod: AT,,, | Performed by: OBSTETRICS & GYNECOLOGY

## 2020-09-16 PROCEDURE — 71000033 HC RECOVERY, INTIAL HOUR: Performed by: OBSTETRICS & GYNECOLOGY

## 2020-09-16 PROCEDURE — 63600175 PHARM REV CODE 636 W HCPCS

## 2020-09-16 PROCEDURE — S0020 INJECTION, BUPIVICAINE HYDRO: HCPCS | Performed by: UROLOGY

## 2020-09-16 PROCEDURE — 62326 NJX INTERLAMINAR LMBR/SAC: CPT | Performed by: STUDENT IN AN ORGANIZED HEALTH CARE EDUCATION/TRAINING PROGRAM

## 2020-09-16 PROCEDURE — 71000039 HC RECOVERY, EACH ADD'L HOUR: Performed by: OBSTETRICS & GYNECOLOGY

## 2020-09-16 PROCEDURE — 25000003 PHARM REV CODE 250: Performed by: UROLOGY

## 2020-09-16 PROCEDURE — 63600175 PHARM REV CODE 636 W HCPCS: Performed by: ADVANCED PRACTICE MIDWIFE

## 2020-09-16 PROCEDURE — 63600175 PHARM REV CODE 636 W HCPCS: Performed by: STUDENT IN AN ORGANIZED HEALTH CARE EDUCATION/TRAINING PROGRAM

## 2020-09-16 PROCEDURE — S0028 INJECTION, FAMOTIDINE, 20 MG: HCPCS | Performed by: ANESTHESIOLOGY

## 2020-09-16 PROCEDURE — 25000003 PHARM REV CODE 250: Performed by: ANESTHESIOLOGY

## 2020-09-16 PROCEDURE — 62326 NJX INTERLAMINAR LMBR/SAC: CPT | Performed by: UROLOGY

## 2020-09-16 PROCEDURE — 36004724 HC OB OR TIME LEV III - 1ST 15 MIN: Performed by: OBSTETRICS & GYNECOLOGY

## 2020-09-16 PROCEDURE — 27200710 HC EPIDURAL INFUSION PUMP SET: Performed by: ANESTHESIOLOGY

## 2020-09-16 PROCEDURE — 01968 PR INSERT CATH,ART,PERCUT,SHORTTERM: ICD-10-PCS | Mod: AA,,, | Performed by: ANESTHESIOLOGY

## 2020-09-16 PROCEDURE — 72100003 HC LABOR CARE, EA. ADDL. 8 HRS

## 2020-09-16 PROCEDURE — 25000003 PHARM REV CODE 250: Performed by: ADVANCED PRACTICE MIDWIFE

## 2020-09-16 PROCEDURE — 51702 INSERT TEMP BLADDER CATH: CPT

## 2020-09-16 PROCEDURE — 59514 CESAREAN DELIVERY ONLY: CPT | Mod: AT,,, | Performed by: OBSTETRICS & GYNECOLOGY

## 2020-09-16 PROCEDURE — 11000001 HC ACUTE MED/SURG PRIVATE ROOM

## 2020-09-16 PROCEDURE — 37000009 HC ANESTHESIA EA ADD 15 MINS: Performed by: OBSTETRICS & GYNECOLOGY

## 2020-09-16 PROCEDURE — 37000008 HC ANESTHESIA 1ST 15 MINUTES: Performed by: OBSTETRICS & GYNECOLOGY

## 2020-09-16 PROCEDURE — 25000003 PHARM REV CODE 250: Performed by: STUDENT IN AN ORGANIZED HEALTH CARE EDUCATION/TRAINING PROGRAM

## 2020-09-16 PROCEDURE — 59514 CESAREAN DELIVERY ONLY: CPT | Mod: AA,,, | Performed by: ANESTHESIOLOGY

## 2020-09-16 PROCEDURE — 59514 PRA REAN DELIVERY ONLY: ICD-10-PCS | Mod: AA,,, | Performed by: ANESTHESIOLOGY

## 2020-09-16 PROCEDURE — 01968 ANES/ANALG CS DLVR NEURAXIAL: CPT | Mod: AA,,, | Performed by: ANESTHESIOLOGY

## 2020-09-16 PROCEDURE — 36004725 HC OB OR TIME LEV III - EA ADD 15 MIN: Performed by: OBSTETRICS & GYNECOLOGY

## 2020-09-16 RX ORDER — LIDOCAINE HCL/EPINEPHRINE/PF 2%-1:200K
VIAL (ML) INJECTION
Status: DISCONTINUED | OUTPATIENT
Start: 2020-09-16 | End: 2020-09-16

## 2020-09-16 RX ORDER — KETOROLAC TROMETHAMINE 30 MG/ML
30 INJECTION, SOLUTION INTRAMUSCULAR; INTRAVENOUS EVERY 6 HOURS
Status: DISCONTINUED | OUTPATIENT
Start: 2020-09-16 | End: 2020-09-16

## 2020-09-16 RX ORDER — DEXAMETHASONE SODIUM PHOSPHATE 4 MG/ML
INJECTION, SOLUTION INTRA-ARTICULAR; INTRALESIONAL; INTRAMUSCULAR; INTRAVENOUS; SOFT TISSUE
Status: DISCONTINUED | OUTPATIENT
Start: 2020-09-16 | End: 2020-09-16

## 2020-09-16 RX ORDER — ACETAMINOPHEN 10 MG/ML
INJECTION, SOLUTION INTRAVENOUS
Status: DISCONTINUED | OUTPATIENT
Start: 2020-09-16 | End: 2020-09-16

## 2020-09-16 RX ORDER — HYDROCORTISONE 25 MG/G
CREAM TOPICAL 3 TIMES DAILY PRN
Status: DISCONTINUED | OUTPATIENT
Start: 2020-09-16 | End: 2020-09-18 | Stop reason: HOSPADM

## 2020-09-16 RX ORDER — FENTANYL/BUPIVACAINE/NS/PF 2MCG/ML-.1
PLASTIC BAG, INJECTION (ML) INJECTION
Status: COMPLETED
Start: 2020-09-16 | End: 2020-09-16

## 2020-09-16 RX ORDER — SODIUM CITRATE AND CITRIC ACID MONOHYDRATE 334; 500 MG/5ML; MG/5ML
30 SOLUTION ORAL ONCE
Status: COMPLETED | OUTPATIENT
Start: 2020-09-16 | End: 2020-09-16

## 2020-09-16 RX ORDER — OXYTOCIN/RINGER'S LACTATE 30/500 ML
95 PLASTIC BAG, INJECTION (ML) INTRAVENOUS ONCE
Status: DISCONTINUED | OUTPATIENT
Start: 2020-09-16 | End: 2020-09-18 | Stop reason: HOSPADM

## 2020-09-16 RX ORDER — OXYCODONE HYDROCHLORIDE 5 MG/1
5 TABLET ORAL EVERY 4 HOURS PRN
Status: DISCONTINUED | OUTPATIENT
Start: 2020-09-16 | End: 2020-09-16

## 2020-09-16 RX ORDER — IBUPROFEN 600 MG/1
600 TABLET ORAL EVERY 6 HOURS
Status: DISCONTINUED | OUTPATIENT
Start: 2020-09-17 | End: 2020-09-18 | Stop reason: HOSPADM

## 2020-09-16 RX ORDER — FENTANYL/BUPIVACAINE/NS/PF 2MCG/ML-.1
PLASTIC BAG, INJECTION (ML) INJECTION CONTINUOUS
Status: DISCONTINUED | OUTPATIENT
Start: 2020-09-16 | End: 2020-09-17 | Stop reason: ALTCHOICE

## 2020-09-16 RX ORDER — PROMETHAZINE HYDROCHLORIDE 25 MG/ML
INJECTION, SOLUTION INTRAMUSCULAR; INTRAVENOUS
Status: DISCONTINUED | OUTPATIENT
Start: 2020-09-16 | End: 2020-09-16

## 2020-09-16 RX ORDER — MORPHINE SULFATE 0.5 MG/ML
INJECTION, SOLUTION EPIDURAL; INTRATHECAL; INTRAVENOUS
Status: DISCONTINUED | OUTPATIENT
Start: 2020-09-16 | End: 2020-09-16

## 2020-09-16 RX ORDER — LIDOCAINE HYDROCHLORIDE AND EPINEPHRINE 15; 5 MG/ML; UG/ML
INJECTION, SOLUTION EPIDURAL
Status: DISCONTINUED | OUTPATIENT
Start: 2020-09-16 | End: 2020-09-16

## 2020-09-16 RX ORDER — DIPHENHYDRAMINE HCL 25 MG
25 CAPSULE ORAL EVERY 4 HOURS PRN
Status: DISCONTINUED | OUTPATIENT
Start: 2020-09-16 | End: 2020-09-18 | Stop reason: HOSPADM

## 2020-09-16 RX ORDER — OXYCODONE AND ACETAMINOPHEN 10; 325 MG/1; MG/1
1 TABLET ORAL EVERY 4 HOURS PRN
Status: DISCONTINUED | OUTPATIENT
Start: 2020-09-16 | End: 2020-09-18 | Stop reason: HOSPADM

## 2020-09-16 RX ORDER — BUPIVACAINE HYDROCHLORIDE 2.5 MG/ML
INJECTION, SOLUTION EPIDURAL; INFILTRATION; INTRACAUDAL
Status: DISPENSED
Start: 2020-09-16 | End: 2020-09-17

## 2020-09-16 RX ORDER — PHENYLEPHRINE HYDROCHLORIDE 10 MG/ML
INJECTION INTRAVENOUS
Status: DISCONTINUED | OUTPATIENT
Start: 2020-09-16 | End: 2020-09-16

## 2020-09-16 RX ORDER — CEFAZOLIN SODIUM 1 G/3ML
INJECTION, POWDER, FOR SOLUTION INTRAMUSCULAR; INTRAVENOUS
Status: DISCONTINUED | OUTPATIENT
Start: 2020-09-16 | End: 2020-09-16

## 2020-09-16 RX ORDER — CARBOPROST TROMETHAMINE 250 UG/ML
250 INJECTION, SOLUTION INTRAMUSCULAR
Status: DISCONTINUED | OUTPATIENT
Start: 2020-09-16 | End: 2020-09-18 | Stop reason: HOSPADM

## 2020-09-16 RX ORDER — MUPIROCIN 20 MG/G
1 OINTMENT TOPICAL 2 TIMES DAILY
Status: DISCONTINUED | OUTPATIENT
Start: 2020-09-16 | End: 2020-09-18 | Stop reason: HOSPADM

## 2020-09-16 RX ORDER — EPHEDRINE SULFATE 50 MG/ML
INJECTION, SOLUTION INTRAVENOUS
Status: DISCONTINUED | OUTPATIENT
Start: 2020-09-16 | End: 2020-09-16

## 2020-09-16 RX ORDER — ONDANSETRON 2 MG/ML
4 INJECTION INTRAMUSCULAR; INTRAVENOUS EVERY 12 HOURS PRN
Status: DISCONTINUED | OUTPATIENT
Start: 2020-09-16 | End: 2020-09-18 | Stop reason: HOSPADM

## 2020-09-16 RX ORDER — FENTANYL CITRATE 50 UG/ML
INJECTION, SOLUTION INTRAMUSCULAR; INTRAVENOUS
Status: DISCONTINUED | OUTPATIENT
Start: 2020-09-16 | End: 2020-09-16

## 2020-09-16 RX ORDER — ONDANSETRON 2 MG/ML
INJECTION INTRAMUSCULAR; INTRAVENOUS
Status: DISCONTINUED | OUTPATIENT
Start: 2020-09-16 | End: 2020-09-16

## 2020-09-16 RX ORDER — ONDANSETRON 8 MG/1
8 TABLET, ORALLY DISINTEGRATING ORAL EVERY 8 HOURS PRN
Status: DISCONTINUED | OUTPATIENT
Start: 2020-09-16 | End: 2020-09-18 | Stop reason: HOSPADM

## 2020-09-16 RX ORDER — BUPIVACAINE HYDROCHLORIDE 2.5 MG/ML
INJECTION, SOLUTION INFILTRATION; PERINEURAL
Status: DISCONTINUED | OUTPATIENT
Start: 2020-09-16 | End: 2020-09-16

## 2020-09-16 RX ORDER — DIPHENHYDRAMINE HCL 25 MG
25 CAPSULE ORAL ONCE
Status: COMPLETED | OUTPATIENT
Start: 2020-09-16 | End: 2020-09-16

## 2020-09-16 RX ORDER — SODIUM CHLORIDE, SODIUM LACTATE, POTASSIUM CHLORIDE, CALCIUM CHLORIDE 600; 310; 30; 20 MG/100ML; MG/100ML; MG/100ML; MG/100ML
INJECTION, SOLUTION INTRAVENOUS CONTINUOUS PRN
Status: DISCONTINUED | OUTPATIENT
Start: 2020-09-16 | End: 2020-09-16

## 2020-09-16 RX ORDER — OXYCODONE AND ACETAMINOPHEN 5; 325 MG/1; MG/1
1 TABLET ORAL EVERY 4 HOURS PRN
Status: DISCONTINUED | OUTPATIENT
Start: 2020-09-16 | End: 2020-09-18 | Stop reason: HOSPADM

## 2020-09-16 RX ORDER — AMOXICILLIN 250 MG
1 CAPSULE ORAL NIGHTLY PRN
Status: DISCONTINUED | OUTPATIENT
Start: 2020-09-16 | End: 2020-09-18 | Stop reason: HOSPADM

## 2020-09-16 RX ORDER — NALOXONE HCL 0.4 MG/ML
0.04 VIAL (ML) INJECTION
Status: DISCONTINUED | OUTPATIENT
Start: 2020-09-16 | End: 2020-09-18 | Stop reason: HOSPADM

## 2020-09-16 RX ORDER — PRENATAL WITH FERROUS FUM AND FOLIC ACID 3080; 920; 120; 400; 22; 1.84; 3; 20; 10; 1; 12; 200; 27; 25; 2 [IU]/1; [IU]/1; MG/1; [IU]/1; MG/1; MG/1; MG/1; MG/1; MG/1; MG/1; UG/1; MG/1; MG/1; MG/1; MG/1
1 TABLET ORAL DAILY
Status: DISCONTINUED | OUTPATIENT
Start: 2020-09-17 | End: 2020-09-18 | Stop reason: HOSPADM

## 2020-09-16 RX ORDER — KETOROLAC TROMETHAMINE 30 MG/ML
30 INJECTION, SOLUTION INTRAMUSCULAR; INTRAVENOUS EVERY 6 HOURS
Status: DISCONTINUED | OUTPATIENT
Start: 2020-09-17 | End: 2020-09-16

## 2020-09-16 RX ORDER — BISACODYL 10 MG
10 SUPPOSITORY, RECTAL RECTAL ONCE AS NEEDED
Status: DISCONTINUED | OUTPATIENT
Start: 2020-09-16 | End: 2020-09-18 | Stop reason: HOSPADM

## 2020-09-16 RX ORDER — OXYTOCIN 10 [USP'U]/ML
INJECTION, SOLUTION INTRAMUSCULAR; INTRAVENOUS
Status: DISCONTINUED | OUTPATIENT
Start: 2020-09-16 | End: 2020-09-16

## 2020-09-16 RX ORDER — FENTANYL CITRATE 50 UG/ML
INJECTION, SOLUTION INTRAMUSCULAR; INTRAVENOUS
Status: COMPLETED
Start: 2020-09-16 | End: 2020-09-16

## 2020-09-16 RX ORDER — OXYTOCIN/RINGER'S LACTATE 30/500 ML
95 PLASTIC BAG, INJECTION (ML) INTRAVENOUS ONCE
Status: DISCONTINUED | OUTPATIENT
Start: 2020-09-16 | End: 2020-09-16

## 2020-09-16 RX ORDER — ACETAMINOPHEN 325 MG/1
650 TABLET ORAL EVERY 6 HOURS SCHEDULED
Status: COMPLETED | OUTPATIENT
Start: 2020-09-16 | End: 2020-09-17

## 2020-09-16 RX ORDER — FAMOTIDINE 10 MG/ML
20 INJECTION INTRAVENOUS ONCE
Status: COMPLETED | OUTPATIENT
Start: 2020-09-16 | End: 2020-09-16

## 2020-09-16 RX ORDER — SODIUM BICARBONATE 1 MEQ/ML
SYRINGE (ML) INTRAVENOUS
Status: DISCONTINUED | OUTPATIENT
Start: 2020-09-16 | End: 2020-09-16

## 2020-09-16 RX ORDER — DOCUSATE SODIUM 100 MG/1
200 CAPSULE, LIQUID FILLED ORAL 2 TIMES DAILY
Status: DISCONTINUED | OUTPATIENT
Start: 2020-09-16 | End: 2020-09-18 | Stop reason: HOSPADM

## 2020-09-16 RX ORDER — ADHESIVE BANDAGE
30 BANDAGE TOPICAL 2 TIMES DAILY PRN
Status: DISCONTINUED | OUTPATIENT
Start: 2020-09-17 | End: 2020-09-18 | Stop reason: HOSPADM

## 2020-09-16 RX ORDER — SODIUM CHLORIDE, SODIUM LACTATE, POTASSIUM CHLORIDE, CALCIUM CHLORIDE 600; 310; 30; 20 MG/100ML; MG/100ML; MG/100ML; MG/100ML
INJECTION, SOLUTION INTRAVENOUS CONTINUOUS
Status: ACTIVE | OUTPATIENT
Start: 2020-09-16 | End: 2020-09-17

## 2020-09-16 RX ORDER — CEFAZOLIN SODIUM 2 G/50ML
2 SOLUTION INTRAVENOUS ONCE
Status: DISCONTINUED | OUTPATIENT
Start: 2020-09-16 | End: 2020-09-16

## 2020-09-16 RX ORDER — KETOROLAC TROMETHAMINE 30 MG/ML
30 INJECTION, SOLUTION INTRAMUSCULAR; INTRAVENOUS EVERY 6 HOURS
Status: DISPENSED | OUTPATIENT
Start: 2020-09-16 | End: 2020-09-17

## 2020-09-16 RX ORDER — BUPIVACAINE HYDROCHLORIDE 2.5 MG/ML
INJECTION, SOLUTION EPIDURAL; INFILTRATION; INTRACAUDAL
Status: DISPENSED
Start: 2020-09-16 | End: 2020-09-16

## 2020-09-16 RX ORDER — MISOPROSTOL 200 UG/1
800 TABLET ORAL
Status: DISCONTINUED | OUTPATIENT
Start: 2020-09-16 | End: 2020-09-18 | Stop reason: HOSPADM

## 2020-09-16 RX ORDER — SIMETHICONE 80 MG
1 TABLET,CHEWABLE ORAL EVERY 6 HOURS PRN
Status: DISCONTINUED | OUTPATIENT
Start: 2020-09-16 | End: 2020-09-18 | Stop reason: HOSPADM

## 2020-09-16 RX ORDER — METHYLERGONOVINE MALEATE 0.2 MG/ML
200 INJECTION INTRAVENOUS
Status: DISCONTINUED | OUTPATIENT
Start: 2020-09-16 | End: 2020-09-18 | Stop reason: HOSPADM

## 2020-09-16 RX ORDER — FENTANYL/BUPIVACAINE/NS/PF 2MCG/ML-.1
PLASTIC BAG, INJECTION (ML) INJECTION CONTINUOUS PRN
Status: DISCONTINUED | OUTPATIENT
Start: 2020-09-16 | End: 2020-09-16

## 2020-09-16 RX ORDER — OXYCODONE HYDROCHLORIDE 5 MG/1
10 TABLET ORAL EVERY 4 HOURS PRN
Status: DISCONTINUED | OUTPATIENT
Start: 2020-09-16 | End: 2020-09-16

## 2020-09-16 RX ORDER — KETOROLAC TROMETHAMINE 30 MG/ML
INJECTION, SOLUTION INTRAMUSCULAR; INTRAVENOUS
Status: COMPLETED
Start: 2020-09-16 | End: 2020-09-16

## 2020-09-16 RX ADMIN — KETOROLAC TROMETHAMINE 30 MG: 30 INJECTION, SOLUTION INTRAMUSCULAR; INTRAVENOUS at 06:09

## 2020-09-16 RX ADMIN — FENTANYL CITRATE 100 MCG: 50 INJECTION, SOLUTION INTRAMUSCULAR; INTRAVENOUS at 02:09

## 2020-09-16 RX ADMIN — SODIUM BICARBONATE 2 MEQ: 84 INJECTION, SOLUTION INTRAVENOUS at 04:09

## 2020-09-16 RX ADMIN — Medication 10 ML/HR: at 02:09

## 2020-09-16 RX ADMIN — Medication 5 ML: at 02:09

## 2020-09-16 RX ADMIN — AZITHROMYCIN MONOHYDRATE 500 MG: 500 INJECTION, POWDER, LYOPHILIZED, FOR SOLUTION INTRAVENOUS at 04:09

## 2020-09-16 RX ADMIN — LIDOCAINE HYDROCHLORIDE,EPINEPHRINE BITARTRATE 3 ML: 15; .005 INJECTION, SOLUTION EPIDURAL; INFILTRATION; INTRACAUDAL; PERINEURAL at 03:09

## 2020-09-16 RX ADMIN — ACETAMINOPHEN 1000 MG: 10 INJECTION, SOLUTION INTRAVENOUS at 05:09

## 2020-09-16 RX ADMIN — PROMETHAZINE HYDROCHLORIDE 6.25 MG: 25 INJECTION INTRAMUSCULAR; INTRAVENOUS at 04:09

## 2020-09-16 RX ADMIN — PHENYLEPHRINE HYDROCHLORIDE 100 MCG: 10 INJECTION INTRAVENOUS at 04:09

## 2020-09-16 RX ADMIN — SODIUM CITRATE AND CITRIC ACID MONOHYDRATE 30 ML: 500; 334 SOLUTION ORAL at 04:09

## 2020-09-16 RX ADMIN — KETOROLAC TROMETHAMINE 30 MG: 30 INJECTION, SOLUTION INTRAMUSCULAR at 06:09

## 2020-09-16 RX ADMIN — PHENYLEPHRINE HYDROCHLORIDE 200 MCG: 10 INJECTION INTRAVENOUS at 03:09

## 2020-09-16 RX ADMIN — EPHEDRINE SULFATE 25 MG: 50 INJECTION INTRAVENOUS at 03:09

## 2020-09-16 RX ADMIN — OXYTOCIN 20 UNITS: 10 INJECTION, SOLUTION INTRAMUSCULAR; INTRAVENOUS at 05:09

## 2020-09-16 RX ADMIN — Medication 1.5 MG: at 05:09

## 2020-09-16 RX ADMIN — FAMOTIDINE 20 MG: 10 INJECTION INTRAVENOUS at 04:09

## 2020-09-16 RX ADMIN — BUPIVACAINE HYDROCHLORIDE 5 ML: 2.5 INJECTION, SOLUTION INFILTRATION; PERINEURAL at 03:09

## 2020-09-16 RX ADMIN — DIPHENHYDRAMINE HYDROCHLORIDE 25 MG: 25 CAPSULE ORAL at 06:09

## 2020-09-16 RX ADMIN — KETOROLAC TROMETHAMINE 30 MG: 30 INJECTION, SOLUTION INTRAMUSCULAR at 11:09

## 2020-09-16 RX ADMIN — CEFAZOLIN 2 G: 330 INJECTION, POWDER, FOR SOLUTION INTRAMUSCULAR; INTRAVENOUS at 04:09

## 2020-09-16 RX ADMIN — DEXAMETHASONE SODIUM PHOSPHATE 4 MG: 4 INJECTION, SOLUTION INTRAMUSCULAR; INTRAVENOUS at 04:09

## 2020-09-16 RX ADMIN — ACETAMINOPHEN 650 MG: 325 TABLET, FILM COATED ORAL at 11:09

## 2020-09-16 RX ADMIN — LIDOCAINE HYDROCHLORIDE,EPINEPHRINE BITARTRATE 3 ML: 15; .005 INJECTION, SOLUTION EPIDURAL; INFILTRATION; INTRACAUDAL; PERINEURAL at 02:09

## 2020-09-16 RX ADMIN — SODIUM CHLORIDE, SODIUM LACTATE, POTASSIUM CHLORIDE, AND CALCIUM CHLORIDE: .6; .31; .03; .02 INJECTION, SOLUTION INTRAVENOUS at 03:09

## 2020-09-16 RX ADMIN — PHENYLEPHRINE HYDROCHLORIDE 200 MCG: 10 INJECTION INTRAVENOUS at 02:09

## 2020-09-16 RX ADMIN — SODIUM CHLORIDE, SODIUM LACTATE, POTASSIUM CHLORIDE, AND CALCIUM CHLORIDE: 600; 310; 30; 20 INJECTION, SOLUTION INTRAVENOUS at 04:09

## 2020-09-16 RX ADMIN — ONDANSETRON HYDROCHLORIDE 4 MG: 2 INJECTION INTRAMUSCULAR; INTRAVENOUS at 04:09

## 2020-09-16 RX ADMIN — DIPHENHYDRAMINE HYDROCHLORIDE 25 MG: 25 CAPSULE ORAL at 11:09

## 2020-09-16 RX ADMIN — SODIUM CHLORIDE, SODIUM LACTATE, POTASSIUM CHLORIDE, AND CALCIUM CHLORIDE 1000 ML: .6; .31; .03; .02 INJECTION, SOLUTION INTRAVENOUS at 02:09

## 2020-09-16 RX ADMIN — FENTANYL CITRATE 100 MCG: 50 INJECTION, SOLUTION INTRAMUSCULAR; INTRAVENOUS at 04:09

## 2020-09-16 RX ADMIN — DOCUSATE SODIUM 200 MG: 100 CAPSULE, LIQUID FILLED ORAL at 09:09

## 2020-09-16 RX ADMIN — LIDOCAINE HYDROCHLORIDE,EPINEPHRINE BITARTRATE 5 ML: 20; .005 INJECTION, SOLUTION EPIDURAL; INFILTRATION; INTRACAUDAL; PERINEURAL at 04:09

## 2020-09-16 RX ADMIN — SODIUM CHLORIDE, SODIUM LACTATE, POTASSIUM CHLORIDE, AND CALCIUM CHLORIDE: .6; .31; .03; .02 INJECTION, SOLUTION INTRAVENOUS at 01:09

## 2020-09-16 NOTE — PROGRESS NOTES
09/16/20 1510   TeleStork Hang Note - Strip   Strip Reviewed by Hang Nurse? Yes   TeleStork Hang Note - Communication   [place] Nurse Communicated with Bedside Nurse Regarding: Fetal Status   TeleStork Hang Note - Notification   Nurse Notified? Yes  (nurses at bedside)   Name of Nurse marcelle ZAYAS

## 2020-09-16 NOTE — PROGRESS NOTES
"LABOR NOTE    S:  Pt resting comfortably with epidural, no complaints now following epidural replacement (following unwitnessed epidural disconnection).  Pt's mother at bedside and supportive.     O: /78   Pulse (!) 111   Temp 98 °F (36.7 °C)   Resp 18   Ht 5' 3" (1.6 m)   Wt 119.4 kg (263 lb 3.7 oz)   LMP 2020   SpO2 98%   Breastfeeding No   BMI 46.63 kg/m²     GENERAL: Calm and appropriate affect  NEURO: Alert, oriented, normal speech  ABDOMEN: Nontender, Fundus palpates soft between UC's.  FHT: Baseline 155, moderate BTBV, positive accels, variable decels. Cat 2.  CTX: q 3-5 minutes  SVE: 4/70/-4 (bollatable)      ASSESSMENT:   24 y.o.  IUP at 37w0d, Cat 2    Patient Active Problem List   Diagnosis    Pregnancy with one fetus, antepartum    Class 1 obesity in adult    Elevated glucose tolerance test    Gestational hypertension, third trimester    Encounter for induction of labor         PLAN:  Continue close Maternal/Fetal monitoring  Pitocin Augmentation paused (was infusing at 20mU) immediately following pt laying down from epidural replacement for significant variable decels. Restarted at 10mU with reassuring fetal tracing per protocol now  Continue frequent maternal position changes  Recheck 4 hours or PRN        Alena Galarza CNM    "

## 2020-09-16 NOTE — PROGRESS NOTES
GRICELDA Kapadia CNM notified pt having repetitive lates despite being turned right, left, and high fowlers. CNM notified pitocin turned down from 8-6 miliunits/min. No orders given. Pt safety maintained. Will continue to monitor.

## 2020-09-16 NOTE — ANESTHESIA PROCEDURE NOTES
Epidural    Patient location during procedure: OB   Reason for block: primary anesthetic   Diagnosis: iup   Start time: 9/16/2020 2:17 AM  Timeout: 9/16/2020 2:17 AM  End time: 9/16/2020 2:27 AM  Surgery related to: Vaginal Delivery    Staffing  Performing Provider: Rod Cyr MD  Authorizing Provider: Shanice Gonzales MD        Preanesthetic Checklist  Completed: patient identified, surgical consent, pre-op evaluation, timeout performed, IV checked, risks and benefits discussed, monitors and equipment checked, anesthesia consent given, hand hygiene performed and patient being monitored  Preparation  Patient position: sitting  Prep: ChloraPrep  Patient monitoring: Pulse Ox  Epidural  Skin Anesthetic: lidocaine 1%  Skin Wheal: 3 mL  Administration type: continuous  Approach: midline  Interspace: L3-4    Injection technique: JAS saline  Needle and Epidural Catheter  Needle type: Tuohy   Needle gauge: 17  Needle length: 3.5 inches  Needle insertion depth: 9 cm  Catheter type: springwound and multi-orifice  Catheter size: 19 G  Catheter at skin depth: 14 cm  Test dose: 3 mL of lidocaine 1.5% with Epi 1-to-200,000  Additional Documentation: incremental injection, negative aspiration for heme and CSF, no paresthesia on injection, no signs/symptoms of IV or SA injection, no significant pain on injection and no significant complaints from patient  Needle localization: anatomical landmarks  Medications:  Volume per aspiration: 5 mL  Time between aspirations: 5 minutes  Assessment  Ease of block: easy  Patient's tolerance of the procedure: comfortable throughout block and no complaintsNo inadvertent dural puncture with Tuohy.  Dural puncture performed with spinal needle.

## 2020-09-16 NOTE — PROGRESS NOTES
09/16/20 0251   TeleStork Hang Note - Strip   Strip Reviewed by Hang Nurse? Yes   TeleStork Hang Note - Communication   [place] Nurse Communicated with Bedside Nurse Regarding: Fetal Status  (Prolonged decel.)   TeleStork Hang Note - Notification   Nurse Notified? Yes  (NUrse at BS)   Name of Nurse Raquel

## 2020-09-16 NOTE — PROGRESS NOTES
"LABOR NOTE    S:  CNM, RNs, OB Resident Physicians, and Anesthesiologist at bedside with FHT deceleration after epidural placement and low BP. BP treated per anesthesia. FHTs returned to baseline following BP treatment. Patient's mother at bedside and supportive.     O: BP (!) 113/58   Pulse 93   Temp 97 °F (36.1 °C) (Temporal)   Resp 20   Ht 5' 3" (1.6 m)   Wt 119.4 kg (263 lb 3.7 oz)   LMP 2020   SpO2 97%   Breastfeeding No   BMI 46.63 kg/m²     GENERAL: Calm and appropriate affect  NEURO: Alert, oriented, normal speech  ABDOMEN: Nontender, Fundus palpates soft between UC's.  FHT: Baseline 160, minimal BTBV, positive accels, variable and prolonged decel. Cat 2. I am writing this note at 0328 and Baseline is currently 150bpm, moderate variability, positive accels, no decels, category 1.  CTX: q 2-4 minutes  SVE: 3/60/-3 intact, soft, posterior      ASSESSMENT:   24 y.o.  IUP at 37w0d, FHT reassuring/ Cat 1    Patient Active Problem List   Diagnosis    Pregnancy with one fetus, antepartum    Class 1 obesity in adult    Elevated glucose tolerance test    Gestational hypertension, third trimester    Encounter for induction of labor         PLAN:  Debriefed with Elo and her mother. All questions answered.  Continue close Maternal/Fetal monitoring  Pitocin remains stopped  Recheck 2 hours or PRN  Continue rest and position changes  "

## 2020-09-16 NOTE — TRANSFER OF CARE
"Anesthesia Transfer of Care Note    Patient: Elo Hays    Procedure(s) Performed: Procedure(s) (LRB):   SECTION (N/A)    Patient location: Labor and Delivery    Anesthesia Type: epidural    Transport from OR: Transported from OR on room air with adequate spontaneous ventilation    Post pain: adequate analgesia    Post assessment: no apparent anesthetic complications and tolerated procedure well    Post vital signs: stable    Level of consciousness: awake, alert and oriented    Nausea/Vomiting: no nausea/vomiting    Complications: none    Transfer of care protocol was followed      Last vitals:   Visit Vitals  /78   Pulse (!) 111   Temp 36.7 °C (98 °F)   Resp 18   Ht 5' 3" (1.6 m)   Wt 119.4 kg (263 lb 3.7 oz)   LMP 2020   SpO2 98%   Breastfeeding Unknown   BMI 46.63 kg/m²     "

## 2020-09-16 NOTE — HPI
Elo Hays is a 24 y.o. [id number] at 36w6d presents with elevated blood pressure in PNT. Denies headache, SOB, RUQ pain, edema, vision changes. States that she has been feeling contractions for some time now, but that they are getting stronger and closer together.  This IUP is complicated by elevated 1 hr (normal 3 hour) and gHTN. Patient reports contractions, denies vaginal bleeding, denies LOF.   Fetal Movement: normal.

## 2020-09-16 NOTE — L&D DELIVERY NOTE
[facility]   Section   Operative Note     Section Procedure Note    Procedure:   1. Primary low transverse  Section via pfannenstiel skin incision    Indications:   1. non-reassuring fetal status    Pre-operative Diagnosis:   1. IUP at 37 week 0 day pregnancy  2. gHTN  3. NRFHT    Post-operative Diagnosis:   same    Surgeon: Heather Briggs MD     Assistants: Anais Loco MD - PGY1                      Vick Titus MD - PGY4    Anesthesia: Spinal anesthesia    Findings:    1. Single viable  Male infant, with APGARS 8/9, weight 3147g.   2. Normal appearing uterus, ovaries, and fallopian tubes.  3. Normal placenta    Estimated Blood Loss:  750 mL           Total IV Fluids: see anesthesia report     UOP: see anesthesia report    Specimens: placenta, discarded    PreOp CBC:   Lab Results   Component Value Date    WBC 16.96 (H) 2020    HGB 10.0 (L) 2020    HCT 30.7 (L) 2020    MCV 88 2020     (H) 2020                     Complications:  None; patient tolerated the procedure well.           Disposition: PACU - hemodynamically stable.           Condition: stable    Procedure Details   The patient was seen in the labor room. After observation of recurrent late decels on fetal heart tracing, the decision was made to proceed to  delivery. The risks, benefits, complications, treatment options, and expected outcomes were discussed with the patient.  The patient concurred with the proposed plan, giving informed consent.  The patient was taken to Operating Room, identified as Elo Hays and the procedure verified as  Delivery. A Time Out was held and the above information confirmed.    After induction of anesthesia, the patient was prepped and draped in the usual sterile manner while placed in a dorsal supine position with a left lateral tilt.  A figueroa catheter was also placed per nursing. Preoperative antibiotics Ancef  2 g and azithromycin 500 mg were administered. An allis test was performed confirming adequate anesthesia.  A Pfannenstiel incision was made and carried down through the subcutaneous tissue to the fascia. Fascial incision was made and extended transversely. The fascia was grasped with [facility] clamps and  from the underlying rectus tissue superiorly and inferiorly. The peritoneum was identified, found to be free of adherent bowel, and entered bluntly. Peritoneal incision was extended longitudinally. The vesico-uterine peritoneum was identified, and bladder blade was inserted. The vesico-uterine peritoneum was incised transversely and the bladder flap was bluntly freed from the lower uterine segment. The bladder blade was reinserted to keep the bladder out of the operative field. A low transverse uterine incision was made with knife and extended with cephalo-caudad traction. The amniotic sac was ruptured with allis and the infant was noted to be in cephalic presentation. The head was brought to the incision and elevated out of the pelvis. The patient delivered a single viable male infant without difficulty.  Infant weighed 3147 grams with Apgar scores of 8/9 at one and five minutes respectively. After the umbilical cord was clamped and cut, cord blood was obtained for evaluation. The placenta was removed intact, appeared normal, and was discarded. The uterus was exteriorized. The uterine incision was closed with running locked sutures of chromic. Hemostasis was observed. The uterine outline, tubes and ovaries appeared normal. The uterus was returned to the abdominal cavity. Incision was reinspected and good hemostasis was noted. The peritoneum and muscle were not reapproximated. The fascia was then reapproximated with running sutures of PDS. The subcutaneous fat was reapproximated with vicryl, and skin was reapproximated with 4-0 monocryl.    Instrument, sponge, and needle counts were correct prior the  abdominal closure and at the conclusion of the case.     Pt tolerated procedure well and was in stable condition after the procedure.      **NOTE: This patient is a candidate for trial of labor after  delivery**    Anais Loco MD/MPH  OB/GYN PGY1         Delivery Information for Andrew Hays    Birth information:  YOB: 2022   Time of birth: 4:50 PM   Sex: male   Head Delivery Date/Time: 2020  4:50 PM   Delivery type: , Low Transverse   Gestational Age: 37w0d    Delivery Providers    Delivering clinician: Heather Briggs MD   Provider Role    MD Luann Rivers, RN     MD Anais Mejia MD             Measurements    Weight: 3147 g  Length: 49.5 cm  Head circumference: 36.2 cm  Chest circumference: 32.4 cm         Apgars    Living status: Living  Apgars:  1 min.:  5 min.:  10 min.:  15 min.:  20 min.:    Skin color:  0  1       Heart rate:  2  2       Reflex irritability:  2  2       Muscle tone:  2  2       Respiratory effort:  2  2       Total:  8  9       Apgars assigned by: NICU         Operative Delivery    Forceps attempted?: No  Vacuum extractor attempted?: No         Shoulder Dystocia    Shoulder dystocia present?: No           Presentation    Presentation: Vertex  Position: Left Occiput Anterior           Interventions/Resuscitation    Method: NICU Attended, Bulb Suctioning       Cord    Vessels: 3 vessels  Complications: None  Delayed Cord Clamping?: No  Cord Clamped Date/Time: 2020  4:50 PM  Cord Blood Disposition: Lab  Gases Sent?: No  Stem Cell Collection (by MD): No       Placenta    Placenta delivery date/time: 2020 1651  Placenta removal: Manual removal  Placenta appearance: Intact  Placenta disposition: discarded           Labor Events:       labor: No     Labor Onset Date/Time:         Dilation Complete Date/Time:         Start Pushing Date/Time:         Start Pushing  Date/Time:       Rupture Date/Time:            Rupture type:          Fluid Amount:       Fluid Color:       Fluid Odor:       Membrane Status: INT (Intact)               steroids: None     Antibiotics given for GBS: No     Induction: balloon dilation (Stafford)     Indications for induction:  Hypertension     Augmentation: oxytocin     Indications for augmentation: Hypertension;Ineffective Contraction Pattern     Labor complications:       Additional complications:          Cervical ripening:                     Delivery:      Episiotomy:       Indication for Episiotomy:       Perineal Lacerations:   Repaired:      Periurethral Laceration:   Repaired:     Labial Laceration:   Repaired:     Sulcus Laceration:   Repaired:     Vaginal Laceration:   Repaired:     Cervical Laceration:   Repaired:     Repair suture:       Repair # of packets:       Last Value - EBL - Nursing (mL):       Sum - EBL - Nursing (mL): 0     Last Value - EBL - Anesthesia (mL):      Calculated QBL (mL): 750      Vaginal Sweep Performed:       Surgicount Correct:         Other providers:       Anesthesia    Method: Epidural          Details (if applicable):  Trial of Labor Yes    Categorization: Primary    Priority: Routine   Indications for : Fetal Intolerance of Labor   Incision Type: low transverse     Additional  information:  Forceps:    Vacuum:    Breech:    Observed anomalies    Other (Comments):

## 2020-09-16 NOTE — PROGRESS NOTES
"LABOR NOTE    S:  Pt resting comfortably with epidural, no complaints.  Her mother is at bedside and supportive.     O: /78   Pulse (!) 111   Temp 98 °F (36.7 °C)   Resp 18   Ht 5' 3" (1.6 m)   Wt 119.4 kg (263 lb 3.7 oz)   LMP 2020   SpO2 98%   Breastfeeding No   BMI 46.63 kg/m²     GENERAL: Calm and appropriate affect  NEURO: Alert, oriented, normal speech  ABDOMEN: Nontender, Fundus palpates soft between UC's.  FHT: Baseline 160, minimal/moderate BTBV, no accels, recurrent late decels.   CTX: q 3-5 minutes  SVE: deferred      ASSESSMENT:   24 y.o.  IUP at 37w0d, Cat 3    Patient Active Problem List   Diagnosis    Pregnancy with one fetus, antepartum    Class 1 obesity in adult    Elevated glucose tolerance test    Gestational hypertension, third trimester    Encounter for induction of labor         PLAN:  Discussed recommendation for primary LTVCS, secondary to nonreassuring fetal status. Pt in agreement. Prep pt for OR  Continue close Maternal/Fetal monitoring  Pitocin Augmentation stopped, pt repositioned  Dr. Briggs given updated report - to pt's bedside to meet her. In agreement re: POC      Alena Galarza CNM    "

## 2020-09-16 NOTE — PROGRESS NOTES
"LABOR NOTE    S:  Was called to L&D due to FHT deceleration. Elo resting in bed upon CNM entering room, 4 minute deceleration resolved with position changes and discontinuing pitocin. Elo is experiencing some anxiety after the need for intrauterine resuscitation. Her mother at bedside and supportive.     O: BP (!) 113/58   Pulse 93   Temp 97 °F (36.1 °C) (Temporal)   Resp 20   Ht 5' 3" (1.6 m)   Wt 119.4 kg (263 lb 3.7 oz)   LMP 2020   SpO2 97%   Breastfeeding No   BMI 46.63 kg/m²     GENERAL: Calm and appropriate affect following discussion about situation  NEURO: Alert, oriented, normal speech  ABDOMEN: Nontender, Fundus palpates soft between UC's.  FHT: Baseline 150, moderate BTBV, no accels, prolonged (4min) decel. Cat 2, is now appearing reassuring and stable.  CTX: q 2-4 minutes  SVE: 3/60/-3 per DARVIN Villanueva      ASSESSMENT:   24 y.o.  IUP at 37w0d, FHT reassuring/ Cat 2    Patient Active Problem List   Diagnosis    Pregnancy with one fetus, antepartum    Class 1 obesity in adult    Elevated glucose tolerance test    Gestational hypertension, third trimester    Encounter for induction of labor       PLAN:  Continue close Maternal/Fetal monitoring  Discussed situation with Elo and her mother. Discussed that I recommend leaving the pitocin off, getting an epidural, and once epidural is effective, restarting pitocin at that time to see if baby tolerates it. Discussed r/b/a. Elo states that she would like to do "whatever is best for the baby - I am open to a  if needed."  Discussed patient case and plan with Dr. Cunha. She agreed with plan to leave pitocin off, get epidural, and then restart pitocin.  Recheck 2 hours or PRN  Epidural per anesthesia      "

## 2020-09-16 NOTE — SUBJECTIVE & OBJECTIVE
Obstetric HPI:    OB History    Para Term  AB Living   2 0 0 0 1 0   SAB TAB Ectopic Multiple Live Births   1 0 0 0 0      # Outcome Date GA Lbr Ish/2nd Weight Sex Delivery Anes PTL Lv   2 Current            1 SAB 2017              Complications: History of D&C     No past medical history on file.  Past Surgical History:   Procedure Laterality Date    DILATION AND CURETTAGE OF UTERUS  2017       PTA Medications   Medication Sig    prenatal vit calc,iron,folic (PRENATAL VITAMIN ORAL) Take by mouth.       Review of patient's allergies indicates:  No Known Allergies     Family History     Problem Relation (Age of Onset)    Diabetes Paternal Grandfather, Paternal Grandmother, Maternal Grandfather    Heart attack Maternal Grandfather    No Known Problems Mother, Father, Brother, Maternal Grandmother        Tobacco Use    Smoking status: Never Smoker    Smokeless tobacco: Never Used   Substance and Sexual Activity    Alcohol use: Not Currently     Frequency: Never     Comment: ocassionally    Drug use: Never    Sexual activity: Yes     Partners: Male     Review of Systems   Constitutional: Negative for activity change and appetite change.   HENT: Negative for tinnitus.    Eyes: Negative for visual disturbance.   Respiratory: Negative for shortness of breath.    Cardiovascular: Negative for leg swelling.   Gastrointestinal: Positive for abdominal pain (Occasionally feels a contraction). Negative for constipation, diarrhea, nausea and vomiting.   Endocrine: Negative for diabetes.   Genitourinary: Negative for flank pain, pelvic pain, vaginal bleeding and vaginal discharge.   Musculoskeletal: Negative for arthralgias, back pain, joint swelling and leg pain.   Integumentary:  Negative for rash, hair changes and breast skin changes.   Neurological: Negative for syncope, numbness and headaches.   Hematological: Does not bruise/bleed easily.   Psychiatric/Behavioral: The patient is nervous/anxious.     Breast: Negative for skin changes     Objective:     Vital Signs (Most Recent):  Pulse: 101 (09/15/20 1843)  Resp: 16 (09/15/20 1828)  BP: 130/78 (09/15/20 1714)  SpO2: 96 % (09/15/20 1840) Vital Signs (24h Range):  Pulse:  [] 101  Resp:  [16] 16  SpO2:  [96 %-98 %] 96 %  BP: (117-142)/(56-85) 130/78     Weight: 119.4 kg (263 lb 3.7 oz)  Body mass index is 46.63 kg/m².    FHT: Cat 1 (reassuring) Baseline 150bpm, moderate variability, positive accels, no decels  TOCO:  Q 2-4 minutes    Physical Exam:   Constitutional: She is oriented to person, place, and time. She appears well-developed and well-nourished.    HENT:   Head: Normocephalic and atraumatic.   Nose: No epistaxis.    Eyes: Conjunctivae and EOM are normal. Right eye exhibits no discharge. Left eye exhibits no discharge.    Neck: Normal range of motion. Neck supple.    Cardiovascular: Normal rate.     Pulmonary/Chest: Effort normal. No respiratory distress.        Abdominal: Soft.     Genitourinary:    Vagina and uterus normal.   negative for vaginal discharge          Musculoskeletal: Normal range of motion and moves all extremeties. No edema.       Neurological: She is alert and oriented to person, place, and time.    Skin: Skin is warm and dry.    Psychiatric: She has a normal mood and affect. Her behavior is normal. Judgment and thought content normal.       Cervix:  Dilation:  1  Effacement:  60  Station: -3  Presentation: Vertex     Significant Labs:  Lab Results   Component Value Date    GROUPTRH A POS 09/15/2020    HEPBSAG Negative 07/07/2020    STREPBCULT No Group B Streptococcus isolated 09/08/2020       CBC:   Recent Labs   Lab 09/15/20  1647   WBC 15.93*   RBC 3.82*   HGB 10.9*   HCT 33.1*   *   MCV 87   MCH 28.5   MCHC 32.9     CMP:   Recent Labs   Lab 09/15/20  0921   *   CALCIUM 9.4   ALBUMIN 2.4*   PROT 7.0      K 4.2   CO2 17*      BUN 6   CREATININE 0.7   ALKPHOS 150*   ALT 10   AST 12   BILITOT 0.2      Henrietta/Creat Ratio:   Recent Labs   Lab 09/15/20  1504   UTPCR 0.20       I have personallly reviewed all pertinent lab results from the last 24 hours.

## 2020-09-16 NOTE — PROGRESS NOTES
"LABOR NOTE    S:  Pt resting comfortably in bed, no complaints. Mother at bedside and supportive.     O: BP (!) 113/58   Pulse 93   Temp 97 °F (36.1 °C) (Temporal)   Resp 20   Ht 5' 3" (1.6 m)   Wt 119.4 kg (263 lb 3.7 oz)   LMP 2020   SpO2 97%   Breastfeeding No   BMI 46.63 kg/m²     GENERAL: Calm and appropriate affect  NEURO: Alert, oriented, normal speech  ABDOMEN: Nontender, Fundus palpates soft between UC's.  FHT: Baseline 135, moderate BTBV, positive accels, no decels. Cat 1, reassuring.  CTX: q 2-4 minutes  SVE: Deferred          Stafford bulb remains in place      ASSESSMENT:   24 y.o.  IUP at 37w0d, FHT reassuring/ Cat 1    Patient Active Problem List   Diagnosis    Pregnancy with one fetus, antepartum    Class 1 obesity in adult    Elevated glucose tolerance test    Gestational hypertension, third trimester    Encounter for induction of labor         PLAN:  Continue close Maternal/Fetal monitoring  Pitocin Augmentation per protocol - currently infusing at 2mu  Recheck 2 hours or PRN  Recommended resting and position changes    "

## 2020-09-16 NOTE — PROGRESS NOTES
"LABOR NOTE    S:  Pt resting comfortably with epidural.     O: BP (!) 113/59   Pulse 94   Temp 97.5 °F (36.4 °C)   Resp 18   Ht 5' 3" (1.6 m)   Wt 119.4 kg (263 lb 3.7 oz)   LMP 2020   SpO2 98%   Breastfeeding No   BMI 46.63 kg/m²     GENERAL: Calm and appropriate affect  NEURO: Alert, oriented, normal speech  ABDOMEN: Nontender, Fundus palpates soft between UC's.  FHT: Baseline 140, moderate BTBV, positive accels, variable decels. Cat 2, reassuring.  CTX: q 2-4 minutes  SVE: 4/70/-4, midposition, soft, intact BOW, ballotable cephalic)      ASSESSMENT:   24 y.o.  IUP at 37w0d, FHT reassuring/ Cat 2    Patient Active Problem List   Diagnosis    Pregnancy with one fetus, antepartum    Class 1 obesity in adult    Elevated glucose tolerance test    Gestational hypertension, third trimester    Encounter for induction of labor         PLAN:  Pt repositioned to far right lat with peanut ball following VE (at 1119). Continue frequent position changes to facilitate fetal descent. Continue close Maternal/Fetal monitoring  Pitocin Augmentation per protocol - currently infusing at 16mu  Recheck 4 hours or PRN      Alena Galarza CNM    "

## 2020-09-16 NOTE — PROGRESS NOTES
Report given to Dr. Navarro. Patient remains in [facility] care. Dr. Navarro is the attending/collaborating physician on call tonight.

## 2020-09-16 NOTE — H&P
[facility]  Obstetrics  History & Physical    Patient Name: Elo Hays  MRN: [id number]  Admission Date: 9/15/2020  Primary Care Provider: Primary Doctor No    Subjective:     Principal Problem:Encounter for induction of labor    History of Present Illness:  Elo Hays is a 24 y.o. [id number] at 36w6d presents with elevated blood pressure in PNT. Denies headache, SOB, RUQ pain, edema, vision changes. States that she has been feeling contractions for some time now, but that they are getting stronger and closer together.  This IUP is complicated by elevated 1 hr (normal 3 hour) and gHTN. Patient reports contractions, denies vaginal bleeding, denies LOF.   Fetal Movement: normal.        Obstetric HPI:    OB History    Para Term  AB Living   2 0 0 0 1 0   SAB TAB Ectopic Multiple Live Births   1 0 0 0 0      # Outcome Date GA Lbr Ish/2nd Weight Sex Delivery Anes PTL Lv   2 Current            1 SAB 2017              Complications: History of D&C     No past medical history on file.  Past Surgical History:   Procedure Laterality Date    DILATION AND CURETTAGE OF UTERUS  2017       PTA Medications   Medication Sig    prenatal vit calc,iron,folic (PRENATAL VITAMIN ORAL) Take by mouth.       Review of patient's allergies indicates:  No Known Allergies     Family History     Problem Relation (Age of Onset)    Diabetes Paternal Grandfather, Paternal Grandmother, Maternal Grandfather    Heart attack Maternal Grandfather    No Known Problems Mother, Father, Brother, Maternal Grandmother        Tobacco Use    Smoking status: Never Smoker    Smokeless tobacco: Never Used   Substance and Sexual Activity    Alcohol use: Not Currently     Frequency: Never     Comment: ocassionally    Drug use: Never    Sexual activity: Yes     Partners: Male     Review of Systems   Constitutional: Negative for activity change and appetite change.   HENT: Negative for tinnitus.    Eyes: Negative for  visual disturbance.   Respiratory: Negative for shortness of breath.    Cardiovascular: Negative for leg swelling.   Gastrointestinal: Positive for abdominal pain (Occasionally feels a contraction). Negative for constipation, diarrhea, nausea and vomiting.   Endocrine: Negative for diabetes.   Genitourinary: Negative for flank pain, pelvic pain, vaginal bleeding and vaginal discharge.   Musculoskeletal: Negative for arthralgias, back pain, joint swelling and leg pain.   Integumentary:  Negative for rash, hair changes and breast skin changes.   Neurological: Negative for syncope, numbness and headaches.   Hematological: Does not bruise/bleed easily.   Psychiatric/Behavioral: The patient is nervous/anxious.    Breast: Negative for skin changes     Objective:     Vital Signs (Most Recent):  Pulse: 101 (09/15/20 1843)  Resp: 16 (09/15/20 1828)  BP: 130/78 (09/15/20 1714)  SpO2: 96 % (09/15/20 1840) Vital Signs (24h Range):  Pulse:  [] 101  Resp:  [16] 16  SpO2:  [96 %-98 %] 96 %  BP: (117-142)/(56-85) 130/78     Weight: 119.4 kg (263 lb 3.7 oz)  Body mass index is 46.63 kg/m².    FHT: Cat 1 (reassuring) Baseline 150bpm, moderate variability, positive accels, no decels  TOCO:  Q 2-4 minutes    Physical Exam:   Constitutional: She is oriented to person, place, and time. She appears well-developed and well-nourished.    HENT:   Head: Normocephalic and atraumatic.   Nose: No epistaxis.    Eyes: Conjunctivae and EOM are normal. Right eye exhibits no discharge. Left eye exhibits no discharge.    Neck: Normal range of motion. Neck supple.    Cardiovascular: Normal rate.     Pulmonary/Chest: Effort normal. No respiratory distress.        Abdominal: Soft.     Genitourinary:    Vagina and uterus normal.   negative for vaginal discharge          Musculoskeletal: Normal range of motion and moves all extremeties. No edema.       Neurological: She is alert and oriented to person, place, and time.    Skin: Skin is warm and dry.     Psychiatric: She has a normal mood and affect. Her behavior is normal. Judgment and thought content normal.       Cervix:  Dilation:  1  Effacement:  60  Station: -3  Presentation: Vertex     Significant Labs:  Lab Results   Component Value Date    GROUPTRH A POS 09/15/2020    HEPBSAG Negative 2020    STREPBCULT No Group B Streptococcus isolated 2020       CBC:   Recent Labs   Lab 09/15/20  1647   WBC 15.93*   RBC 3.82*   HGB 10.9*   HCT 33.1*   *   MCV 87   MCH 28.5   MCHC 32.9     CMP:   Recent Labs   Lab 09/15/20  0921   *   CALCIUM 9.4   ALBUMIN 2.4*   PROT 7.0      K 4.2   CO2 17*      BUN 6   CREATININE 0.7   ALKPHOS 150*   ALT 10   AST 12   BILITOT 0.2     Henrietta/Creat Ratio:   Recent Labs   Lab 09/15/20  1504   UTPCR 0.20       I have personallly reviewed all pertinent lab results from the last 24 hours.    Assessment/Plan:     24 y.o. female  at 36w6d for:    * Encounter for induction of labor  Due to GHTN and category 2 tracing in the [facility]  183 Following r/b/a discussion, patient agrees to have Stafford balloon placed in cervix for manual dilation, and pitocin. She is osvaldo too often for cytotec (mild ctx q 2-5 min). She requests pain management during Stafford balloon placement, so 1mg Stadol was ordered and given IV just prior to Stafford balloon placement.    Gestational hypertension, third trimester  Monitoring BP Q 15 min        Shanda Kapadia CNM  Obstetrics  [facility]

## 2020-09-16 NOTE — PROGRESS NOTES
"LABOR NOTE    S:  Pt resting comfortably in bed, no complaints. Her mother is at bedside and supportive.     O: BP (!) 113/58   Pulse 93   Temp 97 °F (36.1 °C) (Temporal)   Resp 20   Ht 5' 3" (1.6 m)   Wt 119.4 kg (263 lb 3.7 oz)   LMP 2020   SpO2 97%   Breastfeeding No   BMI 46.63 kg/m²     GENERAL: Calm and appropriate affect  NEURO: Alert, oriented, normal speech  ABDOMEN: Nontender, Fundus palpates soft between UC's.  FHT: Baseline 140, moderate BTBV, positive accels, no decels. Cat 1, reassuring.  CTX: q 2-5 minutes  SVE: 3/60/-3 posterior, soft             Stafford bulb fell out      ASSESSMENT:   24 y.o.  IUP at 37w0d, FHT reassuring/ Cat 1    Patient Active Problem List   Diagnosis    Pregnancy with one fetus, antepartum    Class 1 obesity in adult    Elevated glucose tolerance test    Gestational hypertension, third trimester    Encounter for induction of labor         PLAN:  Continue close Maternal/Fetal monitoring  Pitocin Augmentation per protocol - currently infusing at 4mu  Recheck 2 hours or PRN  Resting and position changes      "

## 2020-09-16 NOTE — PROGRESS NOTES
09/16/20 1214   [facility] Note - Strip   Strip Reviewed by Hang Nurse? Yes   [facility] Note - Communication   [facility] Nurse Communicated with Bedside Nurse Regarding: Fetal Status;Contraction Pattern   [facility] Note - Notification   Nurse Notified? Yes  (pt. on side, staff aware of fetal heart tones)   Name of Nurse Luann

## 2020-09-16 NOTE — ASSESSMENT & PLAN NOTE
Due to GHTN and category 2 tracing in the [facility]  1830 Following r/b/a discussion, patient agrees to have Stafford balloon placed in cervix for manual dilation, and pitocin. She is osvaldo too often for cytotec (mild ctx q 2-5 min). She requests pain management during Stafford balloon placement, so 1mg Stadol was ordered and given IV just prior to Stafford balloon placement.

## 2020-09-16 NOTE — ANESTHESIA PROCEDURE NOTES
Epidural    Patient location during procedure: OB   Reason for block: primary anesthetic   Diagnosis: IUP   Start time: 9/16/2020 3:10 PM  Timeout: 9/16/2020 3:10 PM  End time: 9/16/2020 3:20 PM  Surgery related to: Vaginal Delivery    Staffing  Performing Provider: Ruddy Caballero MD  Authorizing Provider: Rabia Quinn MD        Preanesthetic Checklist  Completed: patient identified, site marked, surgical consent, pre-op evaluation, timeout performed, IV checked, risks and benefits discussed, monitors and equipment checked, anesthesia consent given, hand hygiene performed and patient being monitored  Preparation  Patient position: sitting  Prep: ChloraPrep  Patient monitoring: Pulse Ox  Epidural  Skin Anesthetic: lidocaine 1%  Skin Wheal: 3 mL  Administration type: continuous  Approach: midline  Interspace: L3-4    Injection technique: JAS saline  Needle and Epidural Catheter  Needle type: Tuohy   Needle gauge: 17  Needle length: 3.5 inches  Needle insertion depth: 8.5 cm  Catheter type: springwTalkTo  Catheter size: 19 G  Catheter at skin depth: 14 cm  Test dose: 3 mL of lidocaine 1.5% with Epi 1-to-200,000  Additional Documentation: incremental injection, negative aspiration for heme and CSF, no paresthesia on injection, no signs/symptoms of IV or SA injection, no significant pain on injection and no significant complaints from patient  Needle localization: anatomical landmarks  Medications:  Volume per aspiration: 5 mL  Time between aspirations: 5 minutes  Assessment  Upper dermatomal levels - Left: T5  Right: T5   Dermatomal levels determined by pinch or prick  Ease of block: easy  Patient's tolerance of the procedure: comfortable throughout block and no complaintsNo inadvertent dural puncture with Tuohy.  Dural puncture performed with spinal needle.

## 2020-09-16 NOTE — PROGRESS NOTES
"LABOR NOTE    S:  Pt resting comfortably with epidural, no complaints. Mother at bedside and supportive.     O: BP (!) 115/59   Pulse 101   Temp 97.2 °F (36.2 °C) (Temporal)   Resp 16   Ht 5' 3" (1.6 m)   Wt 119.4 kg (263 lb 3.7 oz)   LMP 2020   SpO2 97%   Breastfeeding No   BMI 46.63 kg/m²     GENERAL: Calm and appropriate affect  NEURO: Alert, oriented, normal speech  ABDOMEN: Nontender, Fundus palpates soft between UC's.  FHT: Baseline 145, moderate BTBV, positive accels, no decels. Cat 1, reassuring.  CTX: q 2-4 minutes  SVE: 3/60/-3 posterior, soft, intact, cephalic (ballotable)       ASSESSMENT:   24 y.o.  IUP at 37w0d, FHT reassuring/ Cat 1    Patient Active Problem List   Diagnosis    Pregnancy with one fetus, antepartum    Class 1 obesity in adult    Elevated glucose tolerance test    Gestational hypertension, third trimester    Encounter for induction of labor         PLAN:  Continue close Maternal/Fetal monitoring  Pitocin Augmentation per protocol - currently infusing at 4mu  Recheck 2 hours or PRN  Continue resting and position changes    "

## 2020-09-17 LAB
BASOPHILS # BLD AUTO: 0.05 K/UL (ref 0–0.2)
BASOPHILS NFR BLD: 0.3 % (ref 0–1.9)
DIFFERENTIAL METHOD: ABNORMAL
EOSINOPHIL # BLD AUTO: 0 K/UL (ref 0–0.5)
EOSINOPHIL NFR BLD: 0.1 % (ref 0–8)
ERYTHROCYTE [DISTWIDTH] IN BLOOD BY AUTOMATED COUNT: 13.2 % (ref 11.5–14.5)
HCT VFR BLD AUTO: 30.7 % (ref 37–48.5)
HGB BLD-MCNC: 10 G/DL (ref 12–16)
IMM GRANULOCYTES # BLD AUTO: 0.06 K/UL (ref 0–0.04)
IMM GRANULOCYTES NFR BLD AUTO: 0.4 % (ref 0–0.5)
LYMPHOCYTES # BLD AUTO: 3 K/UL (ref 1–4.8)
LYMPHOCYTES NFR BLD: 17.6 % (ref 18–48)
MCH RBC QN AUTO: 28.6 PG (ref 27–31)
MCHC RBC AUTO-ENTMCNC: 32.6 G/DL (ref 32–36)
MCV RBC AUTO: 88 FL (ref 82–98)
MONOCYTES # BLD AUTO: 1.6 K/UL (ref 0.3–1)
MONOCYTES NFR BLD: 9.6 % (ref 4–15)
NEUTROPHILS # BLD AUTO: 12.2 K/UL (ref 1.8–7.7)
NEUTROPHILS NFR BLD: 72 % (ref 38–73)
NRBC BLD-RTO: 0 /100 WBC
PLATELET # BLD AUTO: 383 K/UL (ref 150–350)
PMV BLD AUTO: 10.3 FL (ref 9.2–12.9)
RBC # BLD AUTO: 3.5 M/UL (ref 4–5.4)
WBC # BLD AUTO: 16.96 K/UL (ref 3.9–12.7)

## 2020-09-17 PROCEDURE — 63600175 PHARM REV CODE 636 W HCPCS: Performed by: ANESTHESIOLOGY

## 2020-09-17 PROCEDURE — 85025 COMPLETE CBC W/AUTO DIFF WBC: CPT

## 2020-09-17 PROCEDURE — 99232 SBSQ HOSP IP/OBS MODERATE 35: CPT | Mod: ,,, | Performed by: OBSTETRICS & GYNECOLOGY

## 2020-09-17 PROCEDURE — 99232 PR SUBSEQUENT HOSPITAL CARE,LEVL II: ICD-10-PCS | Mod: ,,, | Performed by: OBSTETRICS & GYNECOLOGY

## 2020-09-17 PROCEDURE — 36415 COLL VENOUS BLD VENIPUNCTURE: CPT

## 2020-09-17 PROCEDURE — 25000003 PHARM REV CODE 250: Performed by: STUDENT IN AN ORGANIZED HEALTH CARE EDUCATION/TRAINING PROGRAM

## 2020-09-17 PROCEDURE — 63600175 PHARM REV CODE 636 W HCPCS: Performed by: STUDENT IN AN ORGANIZED HEALTH CARE EDUCATION/TRAINING PROGRAM

## 2020-09-17 PROCEDURE — 11000001 HC ACUTE MED/SURG PRIVATE ROOM

## 2020-09-17 PROCEDURE — 94761 N-INVAS EAR/PLS OXIMETRY MLT: CPT

## 2020-09-17 RX ORDER — ENOXAPARIN SODIUM 100 MG/ML
40 INJECTION SUBCUTANEOUS EVERY 12 HOURS
Status: DISCONTINUED | OUTPATIENT
Start: 2020-09-17 | End: 2020-09-18 | Stop reason: HOSPADM

## 2020-09-17 RX ORDER — ACETAMINOPHEN 325 MG/1
650 TABLET ORAL EVERY 6 HOURS SCHEDULED
Status: DISCONTINUED | OUTPATIENT
Start: 2020-09-17 | End: 2020-09-18 | Stop reason: HOSPADM

## 2020-09-17 RX ORDER — FERROUS SULFATE 325(65) MG
325 TABLET, DELAYED RELEASE (ENTERIC COATED) ORAL DAILY
Status: DISCONTINUED | OUTPATIENT
Start: 2020-09-17 | End: 2020-09-18 | Stop reason: HOSPADM

## 2020-09-17 RX ADMIN — IBUPROFEN 600 MG: 600 TABLET, FILM COATED ORAL at 06:09

## 2020-09-17 RX ADMIN — ACETAMINOPHEN 650 MG: 325 TABLET, FILM COATED ORAL at 06:09

## 2020-09-17 RX ADMIN — DOCUSATE SODIUM 200 MG: 100 CAPSULE, LIQUID FILLED ORAL at 09:09

## 2020-09-17 RX ADMIN — ACETAMINOPHEN 650 MG: 325 TABLET, FILM COATED ORAL at 12:09

## 2020-09-17 RX ADMIN — FERROUS SULFATE TAB EC 325 MG (65 MG FE EQUIVALENT) 325 MG: 325 (65 FE) TABLET DELAYED RESPONSE at 09:09

## 2020-09-17 RX ADMIN — ENOXAPARIN SODIUM 40 MG: 40 INJECTION SUBCUTANEOUS at 09:09

## 2020-09-17 RX ADMIN — ACETAMINOPHEN 650 MG: 325 TABLET, FILM COATED ORAL at 11:09

## 2020-09-17 RX ADMIN — MUPIROCIN 1 G: 20 OINTMENT TOPICAL at 09:09

## 2020-09-17 RX ADMIN — KETOROLAC TROMETHAMINE 30 MG: 30 INJECTION, SOLUTION INTRAMUSCULAR at 05:09

## 2020-09-17 RX ADMIN — PRENATAL VIT W/ FE FUMARATE-FA TAB 27-0.8 MG 1 TABLET: 27-0.8 TAB at 09:09

## 2020-09-17 RX ADMIN — ACETAMINOPHEN 650 MG: 325 TABLET, FILM COATED ORAL at 05:09

## 2020-09-17 RX ADMIN — IBUPROFEN 600 MG: 600 TABLET, FILM COATED ORAL at 11:09

## 2020-09-17 RX ADMIN — SODIUM CHLORIDE, SODIUM LACTATE, POTASSIUM CHLORIDE, AND CALCIUM CHLORIDE 500 ML: .6; .31; .03; .02 INJECTION, SOLUTION INTRAVENOUS at 02:09

## 2020-09-17 RX ADMIN — KETOROLAC TROMETHAMINE 30 MG: 30 INJECTION, SOLUTION INTRAMUSCULAR at 12:09

## 2020-09-17 NOTE — DISCHARGE INSTRUCTIONS
Breastfeeding Discharge Instructions       Feed the baby at the earliest sign of hunger or comfort  o Hands to mouth, sucking motions  o Rooting or searching for something to suck on  o Dont wait for crying - it is a sign of distress     The feedings may be 8-12 times per 24hrs and will not follow a schedule   Avoid pacifiers and bottles for the first 4 weeks   Alternate the breast you start the feeding with, or start with the breast that feels the fullest   Switch breasts when the baby takes himself off the breast or falls asleep   Keep offering breasts until the baby looks full, no longer gives hunger signs, and stays asleep when placed on his back in the crib   If the baby is sleepy and wont wake for a feeding, put the baby skin-to-skin dressed in a diaper against the mothers bare chest   Sleep near your baby   The baby should be positioned and latched on to the breast correctly  o Chest-to-chest, chin in the breast  o Babys lips are flipped outward  o Babys mouth is stretched open wide like a shout  o Babys sucking should feel like tugging to the mother  - The baby should be drinking at the breast:  o You should hear swallowing or gulping throughout the feeding  o You should see milk on the babys lips when he comes off the breast  o Your breasts should be softer when the baby is finished feeding  o The baby should look relaxed at the end of feedings  o After the 4th day and your milk is in:  o The babys poop should turn bright yellow and be loose, watery, and seedy  o The baby should have at least 3-4 poops the size of the palm of your hand per day  o The baby should have at least 5-6 wet diapers per day  o The urine should be light yellow in color  You should drink when you are thirsty and eat a healthy diet when you are    hungry.     Take naps to get the rest you need.   Take medications and/or drink alcohol only with permission of your obstetrician    or the babys pediatrician.  You can  also call the [facility],   ([phone]), Monday-Friday, 8am-5pm Central time, to get the most   up-to-date evidence-based information on the use of medications during   pregnancy and breastfeeding.      The baby should be examined by a pediatrician at 3-5 days of age.  Once your   milk comes in, the baby should be gaining at least ½ - 1oz each day and should be back to birthweight no later than 10-14 days of age.          Community Resources    [facility] Breastfeeding Warmline: [phone]   Local [facility] clinics: provide incentives and breastpumps to eligible mothers  [employer] ([employer]LI):  mother-to-mother support group website        www.[employer].BubbleGab  Local La Leche League mother-to-mother support groups:        www.Mclowd        La Leche League [facility]   Dr. Breezy He website for latch videos and general information:        www.breastfeedinginc.ca  [facility] is a call center that provides information about the safety of taking medications while breastfeeding.  Call [phone], M-F, 8am-5pm, CT.  International Lactation Consultant Association provides resources for assistance:        www.ilca.org  [place] Breastfeeding Coalition provides informationand resources for parents  and the community    www.LaBreastfeedingSupport.org     Addie Damon is a mom-to-mom support group:                             www.[employer].com//breastfeedng-support/  [employer] for Healthy Babies:  9-087-698-BABY(3698)  [employer]: a breastfeeding support group for women of color, [phone]

## 2020-09-17 NOTE — PLAN OF CARE
VSS. POC reviewed with pt and pt's mother. Fundus firm, midline with light bleeding. Incision dressing is C/D/I, no drainage noted on dressing. Patient UO inadequate after removal of figueroa catheter. New orders were obtained and patient was given 500cc bolus of LR. Patient voided 500 cc post bolus. Pain controlled with pain regimen. BF well and frequently, bonding with infant. No complaints or concerns at this time. Pt safety maintained, report to be given to night shift RN.

## 2020-09-17 NOTE — PLAN OF CARE
Lactation note:  Lactation consultant's first visit with patient. Reviewed the breastfeeding guide and encouraged the patient to feed infant 8 or more times in 24 hours on cue until content, wake as needed due to late  infant. Infant latched at breast by mom; he needs stimulation/breast compression to nurse. Latch made deeper after intermittent clicking noted. Offered to assist with breastfeeding at next feeding. Left  phone number on board for patient to call for assistance.

## 2020-09-17 NOTE — ANESTHESIA POSTPROCEDURE EVALUATION
Anesthesia Post Evaluation    Patient: Elo Hays    Procedure(s) Performed: Procedure(s) (LRB):   SECTION (N/A)    Final Anesthesia Type: epidural    Patient location during evaluation: floor  Patient participation: Yes- Able to Participate  Level of consciousness: awake and alert  Post-procedure vital signs: reviewed and stable  Pain management: adequate  Airway patency: patent    PONV status at discharge: No PONV  Anesthetic complications: no      Cardiovascular status: hemodynamically stable, blood pressure returned to baseline and stable  Respiratory status: unassisted, spontaneous ventilation and room air  Hydration status: euvolemic  Follow-up not needed.          Vitals Value Taken Time   BP 96/50 20 1231   Temp 36.8 °C (98.3 °F) 20 1231   Pulse 83 20 1231   Resp 18 20 1231   SpO2 95 % 20 1231         Event Time   Out of Recovery 2020 19:59:00         Pain/Jeronimo Score: Pain Rating Prior to Med Admin: 1 (2020 12:21 PM)  Pain Rating Post Med Admin: 0 (2020  1:21 PM)

## 2020-09-17 NOTE — PHYSICIAN QUERY
"This form is a permanent document in the medical record.    Reviewer: LOUIS Ortez,RNC-MNN [email or web address]     Query Date: 2020    By submitting this query, we are merely seeking further clarification of documentation. Please utilize your independent clinical judgment when addressing the question(s) below.    The Medical record contains the following:  Supporting Clinical Findings:    -CNM, RNs, OB Resident Physicians, and Anesthesiologist at bedside with FHT deceleration after epidural placement and low BP. BP treated per anesthesia. FHTs returned to baseline following BP treatment. Patient's mother?at bedside and supportive. per OB Progress note @344am    -Discussed situation with Elo and her mother. Discussed that I recommend leaving the pitocin off, getting an epidural, and once epidural is effective, restarting pitocin at that time to see if baby tolerates it. Discussed r/b/a. Elo states that she would like to do "whatever is best for the baby - I am open to a  if needed."  Discussed patient case and plan with Dr. Cunha. She agreed with plan to leave pitocin off, get epidural, and then restart Pitocin. per OB Progress note @211am    Doctor, Please specify/ clarify diagnosis or diagnoses associated with above clinical findings.    [x ] Hypotension due to epidural  [ ] Other (please specify): _________________________  [ ] Clinically undetermined      Present on admission (POA) status:  [ ] Yes (Y)  [ ] Clinically Undetermined (W)  [ n] No (N)  [ ] Documentation insufficient to determine if condition is POA (U)  Please document in your progress notes daily for the duration of treatment until resolved and include in your discharge summary.      "

## 2020-09-17 NOTE — PROGRESS NOTES
POSTPARTUM PROGRESS NOTE    Elo Hays is a 24 y.o. female POD #1 status post primary  section 2/2 fetal intolerance of labor at 37w0d in a pregnancy complicated by morbid obesity, anemia, and gHTN.    Patient is doing well this morning. She denies nausea, vomiting, fever or chills. Patient reports mild abdominal pain that is adequately relieved by oral pain medications. Lochia is mild to moderate and stable. Patient has not yet voided since figueroa catheter was removed and has not ambulated. She has passed flatus.    Patient does plan to breast feed. Will defer to CNM group for contraception. She desires circumcision.     Objective:       Temp:  [96.7 °F (35.9 °C)-98 °F (36.7 °C)] 97.4 °F (36.3 °C)  Pulse:  [] 95  Resp:  [16-18] 18  SpO2:  [95 %-100 %] 97 %  BP: ()/(51-83) 122/73    General:   Alert, appears stated age and cooperative   Lungs:   Non-labored respirations    Heart:   Regular rate and rhythm   Abdomen:  Soft, non-distended    Uterus: Firm located at the umbilicus   Incision: Bandage in place, clean, dry and intact   Extremities: No pedal edema noted     Lab Review  Recent Results (from the past 4 hour(s))   CBC auto differential    Collection Time: 20  5:29 AM   Result Value Ref Range    WBC 16.96 (H) 3.90 - 12.70 K/uL    RBC 3.50 (L) 4.00 - 5.40 M/uL    Hemoglobin 10.0 (L) 12.0 - 16.0 g/dL    Hematocrit 30.7 (L) 37.0 - 48.5 %    Mean Corpuscular Volume 88 82 - 98 fL    Mean Corpuscular Hemoglobin 28.6 27.0 - 31.0 pg    Mean Corpuscular Hemoglobin Conc 32.6 32.0 - 36.0 g/dL    RDW 13.2 11.5 - 14.5 %    Platelets 383 (H) 150 - 350 K/uL    MPV 10.3 9.2 - 12.9 fL    Immature Granulocytes 0.4 0.0 - 0.5 %    Gran # (ANC) 12.2 (H) 1.8 - 7.7 K/uL    Immature Grans (Abs) 0.06 (H) 0.00 - 0.04 K/uL    Lymph # 3.0 1.0 - 4.8 K/uL    Mono # 1.6 (H) 0.3 - 1.0 K/uL    Eos # 0.0 0.0 - 0.5 K/uL    Baso # 0.05 0.00 - 0.20 K/uL    nRBC 0 0 /100 WBC    Gran% 72.0 38.0 - 73.0 %    Lymph%  17.6 (L) 18.0 - 48.0 %    Mono% 9.6 4.0 - 15.0 %    Eosinophil% 0.1 0.0 - 8.0 %    Basophil% 0.3 0.0 - 1.9 %    Differential Method Automated        I/O    Intake/Output Summary (Last 24 hours) at 2020 0658  Last data filed at 2020 0532  Gross per 24 hour   Intake 2686.66 ml   Output 1720 ml   Net 966.66 ml        Assessment:     Patient Active Problem List   Diagnosis    Pregnancy with one fetus, antepartum    Class 1 obesity in adult    Elevated glucose tolerance test    Gestational hypertension, third trimester    S/P  section        Plan:   1. Postpartum care:  - Patient doing well. Continue routine management and advances.  - Continue PO pain meds. Pain well controlled.  - Heme: H/h 10.9/33.1 >>> 10.0/30.7  - Voiding trial this AM  - Encourage ambulation  - Circumcision desired and consented, orders placed  - Contraception to be discussed with CNM group  - Lactation consult PRN  - Rh positive    2. Gestational HTN:  - BP: ()/(51-83) 122/73  - PreE labs WNL apart from equivocal P:C 0.20  - Asx  - No indication for initiation of anti-hypertensive agent at this time    3. Morbid obesity:  - BMI 46  - SCDs in bed  - Lovenox ordered BID for DVT prophylaxis    4. Anemia of pregnancy:  - H/h as above  - Asx  - Fe/colace      Dispo: As patient meets milestones, will plan to discharge on POD #2-4.      Monique Millard M.D.  OB/GYN PGY-2

## 2020-09-17 NOTE — LACTATION NOTE
20 1100   Maternal Assessment   Breast Shape Bilateral:;pendulous   Breast Density Bilateral:;soft   Areola Right:;elastic   Nipples Right:;everted   Maternal Infant Feeding   Infant Positioning clutch/football   Signs of Milk Transfer audible swallow;infant jaw motion present   Pain with Feeding no   Nipple Shape After Feeding, Right round   Latch Assistance yes   Lactation note:  Reviewed basic breastfeeding education with mother of infant using the breastfeeding guide. Mother able to independently latch her infant to the breast but latch made deeper by LC. Noted intermittent clicking that resolved with deeper latch. Infant nursing effectively with breast compression/stimulation. Encouraged nursing infant at least 8 times in 24 hours on cue until content, waking as needed to feed due to late  infant. Discussed bottles and pacifiers and risks of both as well as benefits of breastfeeding. LC phone number placed on board for further questions or assistance as needed.

## 2020-09-18 VITALS
HEART RATE: 101 BPM | SYSTOLIC BLOOD PRESSURE: 112 MMHG | BODY MASS INDEX: 46.64 KG/M2 | HEIGHT: 63 IN | RESPIRATION RATE: 17 BRPM | TEMPERATURE: 98 F | OXYGEN SATURATION: 97 % | WEIGHT: 263.25 LBS | DIASTOLIC BLOOD PRESSURE: 61 MMHG

## 2020-09-18 PROCEDURE — 25000003 PHARM REV CODE 250: Performed by: STUDENT IN AN ORGANIZED HEALTH CARE EDUCATION/TRAINING PROGRAM

## 2020-09-18 PROCEDURE — 63600175 PHARM REV CODE 636 W HCPCS: Performed by: STUDENT IN AN ORGANIZED HEALTH CARE EDUCATION/TRAINING PROGRAM

## 2020-09-18 PROCEDURE — 99238 HOSP IP/OBS DSCHRG MGMT 30/<: CPT | Mod: ,,, | Performed by: OBSTETRICS & GYNECOLOGY

## 2020-09-18 PROCEDURE — 99238 PR HOSPITAL DISCHARGE DAY,<30 MIN: ICD-10-PCS | Mod: ,,, | Performed by: OBSTETRICS & GYNECOLOGY

## 2020-09-18 RX ORDER — DOCUSATE SODIUM 100 MG/1
200 CAPSULE, LIQUID FILLED ORAL 2 TIMES DAILY
Qty: 30 CAPSULE | Refills: 1 | Status: SHIPPED | OUTPATIENT
Start: 2020-09-18

## 2020-09-18 RX ORDER — OXYCODONE AND ACETAMINOPHEN 5; 325 MG/1; MG/1
1 TABLET ORAL EVERY 4 HOURS PRN
Qty: 20 TABLET | Refills: 0 | Status: SHIPPED | OUTPATIENT
Start: 2020-09-18

## 2020-09-18 RX ORDER — IBUPROFEN 600 MG/1
600 TABLET ORAL EVERY 6 HOURS
Qty: 30 TABLET | Refills: 1 | Status: SHIPPED | OUTPATIENT
Start: 2020-09-18

## 2020-09-18 RX ADMIN — DOCUSATE SODIUM 200 MG: 100 CAPSULE, LIQUID FILLED ORAL at 11:09

## 2020-09-18 RX ADMIN — ENOXAPARIN SODIUM 40 MG: 40 INJECTION SUBCUTANEOUS at 11:09

## 2020-09-18 RX ADMIN — IBUPROFEN 600 MG: 600 TABLET, FILM COATED ORAL at 11:09

## 2020-09-18 RX ADMIN — IBUPROFEN 600 MG: 600 TABLET, FILM COATED ORAL at 06:09

## 2020-09-18 RX ADMIN — ACETAMINOPHEN 650 MG: 325 TABLET, FILM COATED ORAL at 06:09

## 2020-09-18 RX ADMIN — OXYCODONE HYDROCHLORIDE AND ACETAMINOPHEN 1 TABLET: 5; 325 TABLET ORAL at 06:09

## 2020-09-18 RX ADMIN — OXYCODONE HYDROCHLORIDE AND ACETAMINOPHEN 1 TABLET: 10; 325 TABLET ORAL at 02:09

## 2020-09-18 RX ADMIN — OXYCODONE HYDROCHLORIDE AND ACETAMINOPHEN 1 TABLET: 5; 325 TABLET ORAL at 11:09

## 2020-09-18 NOTE — LACTATION NOTE
LC did discharge lactation teaching and reviewed the Mother's Breastfeeding Guide. LC answered all questions. Mother has  phone number  for questions after DC.   Mother may refer to the After Visit Summary for lactation instructions. Gave information on [employer] to go get a breastpump. Call LC as needed.

## 2020-09-18 NOTE — PROGRESS NOTES
POSTPARTUM PROGRESS NOTE    Elo Hays is a 24 y.o. female POD #2 status post primary  section 2/2 fetal intolerance of labor at 37w0d in a pregnancy complicated by morbid obesity, anemia, and gHTN.    Patient is doing well this morning. She denies nausea, vomiting, fever or chills. Patient reports mild abdominal pain that is adequately relieved by oral pain medications. Lochia is mild to moderate and stable. Patient is voiding and ambulating without difficulty. She has passed flatus.    Patient does plan to breast feed. Will defer to CNM group for contraception. She desires circumcision.     Objective:       Temp:  [98.1 °F (36.7 °C)-98.6 °F (37 °C)] 98.1 °F (36.7 °C)  Pulse:  [76-95] 95  Resp:  [16-18] 18  SpO2:  [91 %-98 %] 98 %  BP: ()/(49-69) 98/56    General:   Alert, appears stated age and cooperative   Lungs:   Non-labored respirations    Heart:   Regular rate and rhythm   Abdomen:  Soft, non-distended    Uterus: Firm located at the umbilicus   Incision: Incision clean, dry and intact without erythema or drainage   Extremities: No pedal edema noted     Lab Review  No results found for this or any previous visit (from the past 4 hour(s)).    I/O    Intake/Output Summary (Last 24 hours) at 2020 0703  Last data filed at 2020 0356  Gross per 24 hour   Intake --   Output 2350 ml   Net -2350 ml        Assessment:     Patient Active Problem List   Diagnosis    Pregnancy with one fetus, antepartum    Class 1 obesity in adult    Elevated glucose tolerance test    Gestational hypertension, third trimester    S/P  section        Plan:   1. Postpartum care:  - Patient doing well. Continue routine management and advances.  - Continue PO pain meds. Pain well controlled.  - Heme: H/h 10.9/33.1 >>> 10.0/30.7  - Encourage ambulation  - Circumcision desired and consented, orders placed  - Contraception to be discussed with CNM group  - Lactation consult PRN  - Rh positive    2.  Gestational HTN:  - BP: ()/(49-69) 98/56  - PreE labs WNL apart from equivocal P:C 0.20  - Asx  - No indication for initiation of anti-hypertensive agent at this time    3. Morbid obesity:  - BMI 46  - SCDs in bed  - Lovenox ordered BID for DVT prophylaxis    4. Anemia of pregnancy:  - H/h as above  - Asx  - Fe/colace      Dispo: As patient meets milestones, will plan to discharge on POD #2-4.      Monique Millard M.D.  OB/GYN PGY-2

## 2020-09-18 NOTE — DISCHARGE SUMMARY
Delivery Discharge Summary  Obstetrics      Primary OB Clinician: Heather Briggs MD      Admission date: 9/15/2020  Discharge date: 2020    Disposition: To home, self care    Discharge Diagnosis List:      Patient Active Problem List   Diagnosis    Pregnancy with one fetus, antepartum    Class 1 obesity in adult    Elevated glucose tolerance test    Gestational hypertension, third trimester    S/P  section       Procedure: , due to fetal intolerance of labor     Hospital Course:  Elo Hays is a 24 y.o. now , POD #2 who was admitted on 9/15/2020 at 37w0d for elevated BP in pregnancy. Patient was subsequently admitted to labor and delivery unit with signed consents.       Labor course was complicated by fetal intolerance of labor, and decision was made to proceed with delivery via  which was performed without complications.    Please see delivery note for further details. Her postpartum course was uncomplicated. On discharge day, patient's pain is controlled with oral pain medications. Pt is tolerating ambulation without SOB or CP, and regular diet without N/V. Reports lochia is mild. Denies any HA, vision changes, F/C, LE swelling. Denies any breast pain/soreness.    Pt in stable condition and ready for discharge. She has been instructed to start and/or continue medications and follow up with her obstetrics provider as listed below.    Pertinent studies:  CBC  Recent Labs   Lab 09/15/20  0921 09/15/20  1647 20  0529   WBC 13.57* 15.93* 16.96*   HGB 11.3* 10.9* 10.0*   HCT 33.6* 33.1* 30.7*   MCV 86 87 88   * 428* 383*          Immunization History   Administered Date(s) Administered    Tdap 2020        Delivery:    Episiotomy:     Lacerations:     Repair suture:     Repair # of packets:     Blood loss (ml):       Birth information:  YOB: 2022   Time of birth: 4:50 PM   Sex: male   Delivery type: , Low Transverse    Gestational Age: 37w0d    Delivery Clinician:      Other providers:       Additional  information:  Forceps:    Vacuum:    Breech:    Observed anomalies      Living?:           APGARS  One minute Five minutes Ten minutes   Skin color:         Heart rate:         Grimace:         Muscle tone:         Breathing:         Totals: 8  9        Placenta: Delivered:       appearance      Patient Instructions:   Current Discharge Medication List      START taking these medications    Details   docusate sodium (COLACE) 100 MG capsule Take 2 capsules (200 mg total) by mouth 2 (two) times daily.  Qty: 30 capsule, Refills: 1      ibuprofen (ADVIL,MOTRIN) 600 MG tablet Take 1 tablet (600 mg total) by mouth every 6 (six) hours.  Qty: 30 tablet, Refills: 1      oxyCODONE-acetaminophen (PERCOCET) 5-325 mg per tablet Take 1 tablet by mouth every 4 (four) hours as needed.  Qty: 20 tablet, Refills: 0         CONTINUE these medications which have NOT CHANGED    Details   prenatal vit calc,iron,folic (PRENATAL VITAMIN ORAL) Take by mouth.             Discharge Procedure Orders   Diet Adult Regular     Other restrictions (specify):   Order Comments: Nothing in the vagina for 6 weeks. No lifting anything heavier than 5 lbs. No tub baths, only showers for 6 weeks.     Notify your health care provider if you experience any of the following:  temperature >100.4     Notify your health care provider if you experience any of the following:  persistent nausea and vomiting or diarrhea     Notify your health care provider if you experience any of the following:  severe uncontrolled pain     Notify your health care provider if you experience any of the following:  redness, tenderness, or signs of infection (pain, swelling, redness, odor or green/yellow discharge around incision site)     Notify your health care provider if you experience any of the following:  difficulty breathing or increased cough     Notify your health care provider if you  experience any of the following:  severe persistent headache     Notify your health care provider if you experience any of the following:  worsening rash     Notify your health care provider if you experience any of the following:  persistent dizziness, light-headedness, or visual disturbances     Notify your health care provider if you experience any of the following:  increased confusion or weakness     Notify your health care provider if you experience any of the following:   Order Comments: Notify if bleeding through more than 2 pads per hour for more than one hour.       Follow-up Information     Heidi Felipe CNM In 6 weeks.    Specialty: Obstetrics and Gynecology  Contact information:  [address] [facility] [id number] [phone]                    Gabbie West MD   PGY-1, OB-GYN    '

## 2020-09-20 ENCOUNTER — HOSPITAL ENCOUNTER (EMERGENCY)
Facility: HOSPITAL | Age: 25
Discharge: HOME | End: 2020-09-20
Attending: EMERGENCY MEDICINE
Payer: MEDICAID

## 2020-09-20 VITALS
RESPIRATION RATE: 20 BRPM | SYSTOLIC BLOOD PRESSURE: 117 MMHG | TEMPERATURE: 98 F | HEART RATE: 100 BPM | OXYGEN SATURATION: 98 % | DIASTOLIC BLOOD PRESSURE: 78 MMHG

## 2020-09-20 DIAGNOSIS — R74.8 ABNORMAL AST AND ALT: ICD-10-CM

## 2020-09-20 DIAGNOSIS — M79.602 LEFT ARM PAIN: ICD-10-CM

## 2020-09-20 DIAGNOSIS — M54.9 BACK PAIN, UNSPECIFIED BACK LOCATION, UNSPECIFIED BACK PAIN LATERALITY, UNSPECIFIED CHRONICITY: ICD-10-CM

## 2020-09-20 LAB
ALBUMIN SERPL BCP-MCNC: 2.1 G/DL (ref 3.5–5.2)
ALP SERPL-CCNC: 134 U/L (ref 55–135)
ALT SERPL W/O P-5'-P-CCNC: 64 U/L (ref 10–44)
ANION GAP SERPL CALC-SCNC: 11 MMOL/L (ref 8–16)
AST SERPL-CCNC: 64 U/L (ref 10–40)
BACTERIA #/AREA URNS AUTO: ABNORMAL /HPF
BASOPHILS # BLD AUTO: 0.03 K/UL (ref 0–0.2)
BASOPHILS NFR BLD: 0.3 % (ref 0–1.9)
BILIRUB SERPL-MCNC: 0.3 MG/DL (ref 0.1–1)
BILIRUB UR QL STRIP: NEGATIVE
BUN SERPL-MCNC: 8 MG/DL (ref 6–20)
CALCIUM SERPL-MCNC: 8.6 MG/DL (ref 8.7–10.5)
CHLORIDE SERPL-SCNC: 110 MMOL/L (ref 95–110)
CLARITY UR REFRACT.AUTO: CLEAR
CO2 SERPL-SCNC: 19 MMOL/L (ref 23–29)
COLOR UR AUTO: ABNORMAL
CREAT SERPL-MCNC: 0.7 MG/DL (ref 0.5–1.4)
DIFFERENTIAL METHOD: ABNORMAL
EOSINOPHIL # BLD AUTO: 0.4 K/UL (ref 0–0.5)
EOSINOPHIL NFR BLD: 3.5 % (ref 0–8)
ERYTHROCYTE [DISTWIDTH] IN BLOOD BY AUTOMATED COUNT: 13.5 % (ref 11.5–14.5)
EST. GFR  (AFRICAN AMERICAN): >60 ML/MIN/1.73 M^2
EST. GFR  (NON AFRICAN AMERICAN): >60 ML/MIN/1.73 M^2
FIBRINOGEN PPP-MCNC: 725 MG/DL (ref 182–366)
GLUCOSE SERPL-MCNC: 102 MG/DL (ref 70–110)
GLUCOSE UR QL STRIP: NEGATIVE
HCT VFR BLD AUTO: 31.4 % (ref 37–48.5)
HGB BLD-MCNC: 10.1 G/DL (ref 12–16)
HGB UR QL STRIP: ABNORMAL
IMM GRANULOCYTES # BLD AUTO: 0.02 K/UL (ref 0–0.04)
IMM GRANULOCYTES NFR BLD AUTO: 0.2 % (ref 0–0.5)
KETONES UR QL STRIP: NEGATIVE
LEUKOCYTE ESTERASE UR QL STRIP: ABNORMAL
LYMPHOCYTES # BLD AUTO: 2.9 K/UL (ref 1–4.8)
LYMPHOCYTES NFR BLD: 28.8 % (ref 18–48)
MCH RBC QN AUTO: 29 PG (ref 27–31)
MCHC RBC AUTO-ENTMCNC: 32.2 G/DL (ref 32–36)
MCV RBC AUTO: 90 FL (ref 82–98)
MICROSCOPIC COMMENT: ABNORMAL
MONOCYTES # BLD AUTO: 0.7 K/UL (ref 0.3–1)
MONOCYTES NFR BLD: 6.8 % (ref 4–15)
NEUTROPHILS # BLD AUTO: 6 K/UL (ref 1.8–7.7)
NEUTROPHILS NFR BLD: 60.4 % (ref 38–73)
NITRITE UR QL STRIP: NEGATIVE
NRBC BLD-RTO: 0 /100 WBC
PH UR STRIP: 6 [PH] (ref 5–8)
PLATELET # BLD AUTO: 414 K/UL (ref 150–350)
PMV BLD AUTO: 10.4 FL (ref 9.2–12.9)
POTASSIUM SERPL-SCNC: 3.7 MMOL/L (ref 3.5–5.1)
PROT SERPL-MCNC: 5.9 G/DL (ref 6–8.4)
PROT UR QL STRIP: NEGATIVE
RBC # BLD AUTO: 3.48 M/UL (ref 4–5.4)
RBC #/AREA URNS AUTO: 43 /HPF (ref 0–4)
SODIUM SERPL-SCNC: 140 MMOL/L (ref 136–145)
SP GR UR STRIP: 1.01 (ref 1–1.03)
SQUAMOUS #/AREA URNS AUTO: 5 /HPF
URN SPEC COLLECT METH UR: ABNORMAL
WBC # BLD AUTO: 9.9 K/UL (ref 3.9–12.7)
WBC #/AREA URNS AUTO: 4 /HPF (ref 0–5)

## 2020-09-20 PROCEDURE — 63600175 PHARM REV CODE 636 W HCPCS: Performed by: PHYSICIAN ASSISTANT

## 2020-09-20 PROCEDURE — 80053 COMPREHEN METABOLIC PANEL: CPT

## 2020-09-20 PROCEDURE — 99284 EMERGENCY DEPT VISIT MOD MDM: CPT | Mod: 25

## 2020-09-20 PROCEDURE — 93010 EKG 12-LEAD: ICD-10-PCS | Mod: ,,, | Performed by: INTERNAL MEDICINE

## 2020-09-20 PROCEDURE — 93010 ELECTROCARDIOGRAM REPORT: CPT | Mod: ,,, | Performed by: INTERNAL MEDICINE

## 2020-09-20 PROCEDURE — 81001 URINALYSIS AUTO W/SCOPE: CPT

## 2020-09-20 PROCEDURE — 96374 THER/PROPH/DIAG INJ IV PUSH: CPT

## 2020-09-20 PROCEDURE — 99284 EMERGENCY DEPT VISIT MOD MDM: CPT | Mod: ,,, | Performed by: PHYSICIAN ASSISTANT

## 2020-09-20 PROCEDURE — 85384 FIBRINOGEN ACTIVITY: CPT

## 2020-09-20 PROCEDURE — 25000003 PHARM REV CODE 250: Performed by: PHYSICIAN ASSISTANT

## 2020-09-20 PROCEDURE — 93005 ELECTROCARDIOGRAM TRACING: CPT

## 2020-09-20 PROCEDURE — 85025 COMPLETE CBC W/AUTO DIFF WBC: CPT

## 2020-09-20 PROCEDURE — 99284 PR EMERGENCY DEPT VISIT,LEVEL IV: ICD-10-PCS | Mod: ,,, | Performed by: PHYSICIAN ASSISTANT

## 2020-09-20 RX ORDER — KETOROLAC TROMETHAMINE 30 MG/ML
10 INJECTION, SOLUTION INTRAMUSCULAR; INTRAVENOUS
Status: COMPLETED | OUTPATIENT
Start: 2020-09-20 | End: 2020-09-20

## 2020-09-20 RX ORDER — PROMETHAZINE HYDROCHLORIDE 25 MG/1
12.5 TABLET ORAL EVERY 6 HOURS PRN
Qty: 10 TABLET | Refills: 0 | Status: SHIPPED | OUTPATIENT
Start: 2020-09-20

## 2020-09-20 RX ADMIN — SODIUM CHLORIDE 1000 ML: 9 INJECTION, SOLUTION INTRAVENOUS at 12:09

## 2020-09-20 RX ADMIN — KETOROLAC TROMETHAMINE 10 MG: 30 INJECTION, SOLUTION INTRAMUSCULAR at 12:09

## 2020-09-20 RX ADMIN — Medication 250 MG: at 12:09

## 2020-09-20 NOTE — ED TRIAGE NOTES
Pt ambulated into ED.  States that she had 2 epidurals for  on .  Reports spine pain from head to lower back, headache that worsens with standing, and left arm pain.  Denies SOB, chest pain, fever, or tingling.  Reports pain level 7/10; hydrocodone last taken at 0800 with minimal relief of symptoms.

## 2020-09-20 NOTE — ED PROVIDER NOTES
Encounter Date: 2020       History     Chief Complaint   Patient presents with    Back Pain     s/p  on ; c/o spine pain, severe headache, and left arm pain     The patient is a 24 year old female, who has a past medical history significant for gestational HTN. She denies any known past medical history otherwise. She is s/p  under epidural on 20. She states that she gradually developed a dull global throbbing headache and dull back pain after her surgery. She states that the pain seems to be worse when she is standing. She states that the pain seems to radiate to her left upper arm. She states that she has had associated nausea. She states that she has been taking Oxycodone 5 mg and Ibuprofen 600 mg tablets for pain at home. She states that the medicines reduce her pain level substantially, but 3-4 hours later, the drugs seem to wear off because her pain level creeps back up. She denies any chest pain or SOB. She denies any fever or chills. She states that her surgical incision is doing fine. She denies any numbness, tingling, or weakness. She denies any additional symptoms.     Of note, her  also checked in as a peds ER patient due to feeding difficulties and is being admitted.            Review of patient's allergies indicates:  No Known Allergies  Past Medical History:   Diagnosis Date    Gestational hypertension      Past Surgical History:   Procedure Laterality Date     SECTION N/A 2020    Procedure:  SECTION;  Surgeon: Heather Briggs MD;  [facility];  Service: OB/GYN;  Laterality: N/A;    DILATION AND CURETTAGE OF UTERUS       Family History   Problem Relation Age of Onset    Diabetes Paternal Grandfather     Diabetes Paternal Grandmother     Diabetes Maternal Grandfather     Heart attack Maternal Grandfather         X2,  from heart issues    No Known Problems Mother     No Known Problems Father     No Known Problems  Brother     No Known Problems Maternal Grandmother      Social History     Tobacco Use    Smoking status: Never Smoker    Smokeless tobacco: Never Used   Substance Use Topics    Alcohol use: Not Currently     Frequency: Never     Comment: ocassionally    Drug use: Never     Review of Systems   Constitutional: Negative for activity change, chills, diaphoresis, fatigue and fever.   HENT: Negative for congestion, rhinorrhea and sore throat.    Eyes: Negative for visual disturbance.   Respiratory: Negative for cough, chest tightness and shortness of breath.    Cardiovascular: Negative for chest pain, palpitations and leg swelling.   Gastrointestinal: Positive for nausea. Negative for abdominal distention, abdominal pain, diarrhea and vomiting.   Genitourinary: Negative for difficulty urinating, dysuria, flank pain, frequency, pelvic pain and vaginal discharge.   Musculoskeletal: Positive for back pain. Negative for gait problem, myalgias and neck pain.   Skin: Negative for color change and rash.   Allergic/Immunologic: Negative for immunocompromised state.   Neurological: Negative for dizziness, tremors, seizures, syncope, facial asymmetry, speech difficulty, weakness, light-headedness, numbness and headaches.   Hematological: Does not bruise/bleed easily.   Psychiatric/Behavioral: Negative for confusion.       Physical Exam     Initial Vitals   BP Pulse Resp Temp SpO2   09/20/20 1133 09/20/20 1133 09/20/20 1133 09/20/20 1316 09/20/20 1133   117/78 99 18 98.4 °F (36.9 °C) 98 %      MAP       --                Physical Exam    Nursing note and vitals reviewed.  Constitutional: She appears well-developed and well-nourished. She is not diaphoretic.   She is alert and ambulatory. She does not appear toxic. She does appear to be in mild distress. She is accompanied by her mother.    HENT:   Head: Normocephalic.   Mouth/Throat: Oropharynx is clear and moist.   Unremarkable ENT exam. Moist mucous membranes.    Eyes:  Conjunctivae and EOM are normal. Pupils are equal, round, and reactive to light.   No injection.    Neck: Normal range of motion. Neck supple.   No nuchal rigidity. Non-tender.    Cardiovascular: Normal rate, regular rhythm and intact distal pulses.   Pulmonary/Chest: No respiratory distress. She has no wheezes.   No tachypnea. No conversational dyspnea. No cough. No hypoxia.    Abdominal: Soft. There is no rebound and no guarding.   Musculoskeletal: Normal range of motion.      Comments: No midline spine pain to palpation. Left upper extremity non-tender with FROM. No calf pain.     Neurological: She is alert and oriented to person, place, and time. She has normal strength. No sensory deficit.   Normal speech. Normal gait. 5/5 strength extremities x 4. No focal deficit.    Skin: Skin is warm and dry. No rash noted.   Psychiatric: She has a normal mood and affect. Her behavior is normal.         ED Course   Procedures  Labs Reviewed   CBC W/ AUTO DIFFERENTIAL - Abnormal; Notable for the following components:       Result Value    RBC 3.48 (*)     Hemoglobin 10.1 (*)     Hematocrit 31.4 (*)     Platelets 414 (*)     All other components within normal limits   URINALYSIS, REFLEX TO URINE CULTURE - Abnormal; Notable for the following components:    Occult Blood UA 3+ (*)     Leukocytes, UA Trace (*)     All other components within normal limits    Narrative:     Specimen Source->Urine   FIBRINOGEN - Abnormal; Notable for the following components:    Fibrinogen 725 (*)     All other components within normal limits   URINALYSIS MICROSCOPIC - Abnormal; Notable for the following components:    RBC, UA 43 (*)     All other components within normal limits    Narrative:     Specimen Source->Urine   COMPREHENSIVE METABOLIC PANEL - Abnormal; Notable for the following components:    CO2 19 (*)     Calcium 8.6 (*)     Total Protein 5.9 (*)     Albumin 2.1 (*)     AST 64 (*)     ALT 64 (*)     All other components within normal  limits     Results for orders placed or performed during the hospital encounter of 09/20/20   CBC auto differential   Result Value Ref Range    WBC 9.90 3.90 - 12.70 K/uL    RBC 3.48 (L) 4.00 - 5.40 M/uL    Hemoglobin 10.1 (L) 12.0 - 16.0 g/dL    Hematocrit 31.4 (L) 37.0 - 48.5 %    Mean Corpuscular Volume 90 82 - 98 fL    Mean Corpuscular Hemoglobin 29.0 27.0 - 31.0 pg    Mean Corpuscular Hemoglobin Conc 32.2 32.0 - 36.0 g/dL    RDW 13.5 11.5 - 14.5 %    Platelets 414 (H) 150 - 350 K/uL    MPV 10.4 9.2 - 12.9 fL    Immature Granulocytes 0.2 0.0 - 0.5 %    Gran # (ANC) 6.0 1.8 - 7.7 K/uL    Immature Grans (Abs) 0.02 0.00 - 0.04 K/uL    Lymph # 2.9 1.0 - 4.8 K/uL    Mono # 0.7 0.3 - 1.0 K/uL    Eos # 0.4 0.0 - 0.5 K/uL    Baso # 0.03 0.00 - 0.20 K/uL    nRBC 0 0 /100 WBC    Gran% 60.4 38.0 - 73.0 %    Lymph% 28.8 18.0 - 48.0 %    Mono% 6.8 4.0 - 15.0 %    Eosinophil% 3.5 0.0 - 8.0 %    Basophil% 0.3 0.0 - 1.9 %    Differential Method Automated    Urinalysis, Reflex to Urine Culture Urine, Clean Catch    Specimen: Urine   Result Value Ref Range    Specimen UA Urine, Clean Catch     Color, UA Straw Yellow, Straw, Viviane    Appearance, UA Clear Clear    pH, UA 6.0 5.0 - 8.0    Specific Gravity, UA 1.010 1.005 - 1.030    Protein, UA Negative Negative    Glucose, UA Negative Negative    Ketones, UA Negative Negative    Bilirubin (UA) Negative Negative    Occult Blood UA 3+ (A) Negative    Nitrite, UA Negative Negative    Leukocytes, UA Trace (A) Negative   Fibrinogen   Result Value Ref Range    Fibrinogen 725 (H) 182 - 366 mg/dL   Urinalysis Microscopic   Result Value Ref Range    RBC, UA 43 (H) 0 - 4 /hpf    WBC, UA 4 0 - 5 /hpf    Bacteria Rare None-Occ /hpf    Squam Epithel, UA 5 /hpf    Microscopic Comment SEE COMMENT    Comprehensive metabolic panel   Result Value Ref Range    Sodium 140 136 - 145 mmol/L    Potassium 3.7 3.5 - 5.1 mmol/L    Chloride 110 95 - 110 mmol/L    CO2 19 (L) 23 - 29 mmol/L    Glucose 102 70 -  "110 mg/dL    BUN, Bld 8 6 - 20 mg/dL    Creatinine 0.7 0.5 - 1.4 mg/dL    Calcium 8.6 (L) 8.7 - 10.5 mg/dL    Total Protein 5.9 (L) 6.0 - 8.4 g/dL    Albumin 2.1 (L) 3.5 - 5.2 g/dL    Total Bilirubin 0.3 0.1 - 1.0 mg/dL    Alkaline Phosphatase 134 55 - 135 U/L    AST 64 (H) 10 - 40 U/L    ALT 64 (H) 10 - 44 U/L    Anion Gap 11 8 - 16 mmol/L    eGFR if African American >60.0 >60 mL/min/1.73 m^2    eGFR if non African American >60.0 >60 mL/min/1.73 m^2       EKG Readings: (Independently Interpreted)   Initial Reading: No STEMI. Previous EKG: Compared with most recent EKG Rhythm: Normal Sinus Rhythm. Heart Rate: 98. ST Segments: Normal ST Segments. T Waves: Normal.       Imaging Results    None       Vitals:    09/20/20 1133 09/20/20 1316 09/20/20 1331   BP: 117/78     BP Location: Left arm     Patient Position: Sitting     Pulse: 99  100   Resp: 18  20   Temp:  98.4 °F (36.9 °C)    TempSrc:  Oral    SpO2: 98%            Medical Decision Making:   History:   Old Medical Records: I decided to obtain old medical records.  Clinical Tests:   Lab Tests: Ordered and Reviewed  Medical Tests: Ordered and Reviewed  ED Management:  I discussed the case in detail with the ER attending physician. Will order IVF's Toradol, Caffeine, and phenergan. Will check basic labs, UA, and Fibrinogen.   Pt reports pain level down to "2 or 3 out of 10" on the pain scale after treatment given in the ER   She declined phenergan - states that her nausea has resolved    Pt advised of postspinal headache/backpain - discussed therapies including blood patch   Pt advised to rest, hydrate, and position   Slightly elevated AST & ALT noted - pt denies over taking Tylenol - reports only taking in Percocet 5 and only taking up to 4 tablets per day max   Patient advised to have LFT's re-checked in the next 24-48 hours   Patient advised to return to the ER if unimproved or if worse in any way                               Clinical Impression:     ICD-10-CM " ICD-9-CM   1. Postpartum spinal headache  O89.4 668.84   2. Left arm pain  M79.602 729.5   3. Back pain, unspecified back location, unspecified back pain laterality, unspecified chronicity  M54.9 724.5   4. Breast feeding status of mother  Z39.1 V24.1   5. Abnormal AST and ALT  R74.8 790.4                      Disposition:   Disposition: Discharged  Condition: Stable     ED Disposition Condition    Discharge Stable        ED Prescriptions     Medication Sig Dispense Start Date End Date Auth. Provider    acetaminophen-caffeine 500-65 mg Tab Take 1 tablet by mouth every 4 (four) hours as needed (Headache/pain). 20 tablet 9/20/2020  Reji Sharp PA-C    promethazine (PHENERGAN) 25 MG tablet Take 0.5 tablets (12.5 mg total) by mouth every 6 (six) hours as needed for Nausea. 10 tablet 9/20/2020  Reji Sharp PA-C        Follow-up Information     Follow up With Specialties Details Why Contact Info    Heather Briggs MD Obstetrics and Gynecology Schedule an appointment as soon as possible for a visit  Follow up with your OB/GYN physician in the next 1-2 days for re-evaluation and further management. Rest and hydrate. Continue all medications as directed. [address] [facility] [id number]  [phone]      [facility]  If symptoms worsen in any way or if unimproved. [address] [facility] [id number] [phone]                                       Reji Sharp PA-C  09/20/20 1423       Reji Sharp PA-C  09/20/20 1530

## 2020-09-20 NOTE — ED NOTES
Pt. Reports her headache has resolved, mild pain still to left arm. Pt. Denies nausea and does not want phenergan at this time.

## 2020-09-21 ENCOUNTER — HOSPITAL ENCOUNTER (EMERGENCY)
Facility: HOSPITAL | Age: 25
Discharge: HOME | End: 2020-09-21
Attending: EMERGENCY MEDICINE
Payer: MEDICAID

## 2020-09-21 ENCOUNTER — ANESTHESIA EVENT (OUTPATIENT)
Dept: ANESTHESIOLOGY | Facility: HOSPITAL | Age: 25
End: 2020-09-21
Payer: MEDICAID

## 2020-09-21 ENCOUNTER — ANESTHESIA (OUTPATIENT)
Dept: ANESTHESIOLOGY | Facility: HOSPITAL | Age: 25
End: 2020-09-21
Payer: MEDICAID

## 2020-09-21 VITALS
DIASTOLIC BLOOD PRESSURE: 74 MMHG | RESPIRATION RATE: 18 BRPM | TEMPERATURE: 99 F | HEART RATE: 99 BPM | OXYGEN SATURATION: 100 % | BODY MASS INDEX: 44.3 KG/M2 | WEIGHT: 250 LBS | SYSTOLIC BLOOD PRESSURE: 128 MMHG | HEIGHT: 63 IN

## 2020-09-21 DIAGNOSIS — Z98.891 S/P CESAREAN SECTION: ICD-10-CM

## 2020-09-21 DIAGNOSIS — R51.9 NONINTRACTABLE HEADACHE, UNSPECIFIED CHRONICITY PATTERN, UNSPECIFIED HEADACHE TYPE: ICD-10-CM

## 2020-09-21 DIAGNOSIS — M79.89 LEG SWELLING: ICD-10-CM

## 2020-09-21 DIAGNOSIS — R79.89 ABNORMAL LFTS (LIVER FUNCTION TESTS): ICD-10-CM

## 2020-09-21 DIAGNOSIS — M54.9 BACK PAIN, UNSPECIFIED BACK LOCATION, UNSPECIFIED BACK PAIN LATERALITY, UNSPECIFIED CHRONICITY: Primary | ICD-10-CM

## 2020-09-21 LAB
ALBUMIN SERPL BCP-MCNC: 2.2 G/DL (ref 3.5–5.2)
ALP SERPL-CCNC: 197 U/L (ref 55–135)
ALT SERPL W/O P-5'-P-CCNC: 64 U/L (ref 10–44)
ANION GAP SERPL CALC-SCNC: 10 MMOL/L (ref 8–16)
AST SERPL-CCNC: 43 U/L (ref 10–40)
BASOPHILS # BLD AUTO: 0.03 K/UL (ref 0–0.2)
BASOPHILS NFR BLD: 0.4 % (ref 0–1.9)
BILIRUB SERPL-MCNC: 0.2 MG/DL (ref 0.1–1)
BNP SERPL-MCNC: 49 PG/ML (ref 0–99)
BUN SERPL-MCNC: 7 MG/DL (ref 6–20)
CALCIUM SERPL-MCNC: 8.7 MG/DL (ref 8.7–10.5)
CHLORIDE SERPL-SCNC: 111 MMOL/L (ref 95–110)
CO2 SERPL-SCNC: 20 MMOL/L (ref 23–29)
CREAT SERPL-MCNC: 0.7 MG/DL (ref 0.5–1.4)
DIFFERENTIAL METHOD: ABNORMAL
EOSINOPHIL # BLD AUTO: 0.3 K/UL (ref 0–0.5)
EOSINOPHIL NFR BLD: 3.9 % (ref 0–8)
ERYTHROCYTE [DISTWIDTH] IN BLOOD BY AUTOMATED COUNT: 13.6 % (ref 11.5–14.5)
EST. GFR  (AFRICAN AMERICAN): >60 ML/MIN/1.73 M^2
EST. GFR  (NON AFRICAN AMERICAN): >60 ML/MIN/1.73 M^2
GLUCOSE SERPL-MCNC: 94 MG/DL (ref 70–110)
HCT VFR BLD AUTO: 30.4 % (ref 37–48.5)
HGB BLD-MCNC: 9.5 G/DL (ref 12–16)
IMM GRANULOCYTES # BLD AUTO: 0.03 K/UL (ref 0–0.04)
IMM GRANULOCYTES NFR BLD AUTO: 0.4 % (ref 0–0.5)
LYMPHOCYTES # BLD AUTO: 2 K/UL (ref 1–4.8)
LYMPHOCYTES NFR BLD: 26.9 % (ref 18–48)
MCH RBC QN AUTO: 28.9 PG (ref 27–31)
MCHC RBC AUTO-ENTMCNC: 31.3 G/DL (ref 32–36)
MCV RBC AUTO: 92 FL (ref 82–98)
MONOCYTES # BLD AUTO: 0.6 K/UL (ref 0.3–1)
MONOCYTES NFR BLD: 7.6 % (ref 4–15)
NEUTROPHILS # BLD AUTO: 4.5 K/UL (ref 1.8–7.7)
NEUTROPHILS NFR BLD: 60.8 % (ref 38–73)
NRBC BLD-RTO: 0 /100 WBC
PLATELET # BLD AUTO: 436 K/UL (ref 150–350)
PMV BLD AUTO: 9.8 FL (ref 9.2–12.9)
POTASSIUM SERPL-SCNC: 3.9 MMOL/L (ref 3.5–5.1)
PROT SERPL-MCNC: 6.1 G/DL (ref 6–8.4)
RBC # BLD AUTO: 3.29 M/UL (ref 4–5.4)
SODIUM SERPL-SCNC: 141 MMOL/L (ref 136–145)
WBC # BLD AUTO: 7.35 K/UL (ref 3.9–12.7)

## 2020-09-21 PROCEDURE — 99212 OFFICE O/P EST SF 10 MIN: CPT | Mod: 24,,, | Performed by: ANESTHESIOLOGY

## 2020-09-21 PROCEDURE — 99285 PR EMERGENCY DEPT VISIT,LEVEL V: ICD-10-PCS | Mod: ,,, | Performed by: PHYSICIAN ASSISTANT

## 2020-09-21 PROCEDURE — 99284 EMERGENCY DEPT VISIT MOD MDM: CPT | Mod: 25

## 2020-09-21 PROCEDURE — 83880 ASSAY OF NATRIURETIC PEPTIDE: CPT

## 2020-09-21 PROCEDURE — 80053 COMPREHEN METABOLIC PANEL: CPT

## 2020-09-21 PROCEDURE — 99285 EMERGENCY DEPT VISIT HI MDM: CPT | Mod: ,,, | Performed by: PHYSICIAN ASSISTANT

## 2020-09-21 PROCEDURE — 85025 COMPLETE CBC W/AUTO DIFF WBC: CPT

## 2020-09-21 PROCEDURE — 99212 PR OFFICE/OUTPT VISIT, EST, LEVL II, 10-19 MIN: ICD-10-PCS | Mod: 24,,, | Performed by: ANESTHESIOLOGY

## 2020-09-21 RX ORDER — BUTALBITAL, ACETAMINOPHEN AND CAFFEINE 50; 325; 40 MG/1; MG/1; MG/1
1 TABLET ORAL EVERY 4 HOURS PRN
Qty: 20 TABLET | Refills: 0 | Status: SHIPPED | OUTPATIENT
Start: 2020-09-21 | End: 2020-10-21

## 2020-09-21 NOTE — ED NOTES
Patient identifiers have been checked and are correct.      LOC: The patient is awake, alert, and aware of environment. The patient is oriented x 3 and speaking appropriately.   APPEARANCE: No acute distress noted.   PSYCHOSOCIAL: Patient is calm and cooperative.   SKIN: The skin is warm, dry  RESPIRATORY: Airway is open and patent. Bilateral chest rise and fall. Respirations are spontaneous, even and unlabored. Normal effort and rate noted. No accessory muscle use noted.   CARDIAC: Patient has a normal rate   NEUROLOGIC: Eyes open spontaneously. Speech clear. Tolerating saliva secretions well. Able to follow commands, demonstrating ability to actively and appropriately communicate within context of current conversation. Symmetrical facial muscles.  Movement is purposeful.    MUSCULOSKELETAL: No obvious deformities noted. C/o mid lower back pain that radiates up to head causing HA.

## 2020-09-21 NOTE — DISCHARGE INSTRUCTIONS
Your blood work shows mildly abnormal liver function tests.  Please follow-up with your primary care provider in 1 week to have this repeated.    If you develop worsening abdominal pain, yellow discoloration of the skin, changes in your stool or any new or concerning symptoms, return to the ER.    You are being discharged with Fioricet, a medication containing Tylenol.  Do not exceed 4 g of Tylenol in any 24 hr.  Please be mindful that another prescription medication at your taking contains Tylenol.

## 2020-09-21 NOTE — ED TRIAGE NOTES
Reports was seen yesterday for back pain and HA. Told pain was from epidural done 5 days ago for  delivery. Told to come back if pain did not get better for a blood patch. Reports meds helped in the night but pain back currently. Took hydrocodone @ 0830 today, currently 6/10 pain.

## 2020-09-21 NOTE — CONSULTS
Called to evaluate patient for PDPH and possible blood patch.    Ms Hays is a 24 year old woman presenting on POD #4 from epidural to  with headache. She had an epidural placed on 9/15 at 1713, which then had to be removed due to unwitnessed disconnect and replaced on  at 1751. She underwent successful C/S on  via the epidural.    Patient states that she began developing a headache approximately 2 days after the procedure. The pain is different from tension headaches that she normally has. She describes the pain as continuous, located all around the head and into the back. The pain sometimes radiates into her L arm. She finds bright light mildly bothersome, but denies td photophobia. She finds that Tylenol with caffeine has been moderately helpful treating the pain, and hydrocodone has worked very well. The pain gets worse with activity and is relieved with rest, but she doesn't think lying down necessarily helps. At the time of my interview at 4:30 PM on , her pain was a 2/10 as she was sitting comfortably in bed, and she last used hydrocodone at 8:30 AM.     I had a discussion with Ms Hays and explained that I think this is unlikely to be a PDPH. I described the that getting a blood patch requires a third epidural, along with the risks involved in that, and she told me that she had a bad experience with the previous two and would like to avoid a third. I said that if it is a PDPH, I would encourage her to continue the conservative treatment she has been finding successful and it would likely self-resolve in the next several days, as we are already 5 days out from the second epidural placement. I assured her that if the pain continues or gets worse she can come back or contact the OB anesthesia team at [facility] for further recommendations and treatment.    I relayed this information to Alena Ocampo and Dr. Lopez who were in agreement with my assessment.    Norberto Wells MD  PGY-3  Anesthesiology  [facility]      I agree with the statement above. I reviewed the information, discussed the plan with the patient, and agree with above.    Bartolome Christopher MD

## 2020-09-21 NOTE — ED PROVIDER NOTES
Encounter Date: 2020       History     Chief Complaint   Patient presents with    Headache     seen here yest, told to come back told may need blood patch, had epidural 5 d ago, still having problems    Back Pain     leg swelling     This is a 24 year old female with no significant PMH who presents to the ED with a chief complaint of back pain. Patient is  s/p  section . She is breastfeeding her infant, who is currently admitted in PICU with feeding difficulties and jaundice. She was seen yesterday with headache and back pain. Today she reports improvement in the headache. She reports back pain radiating into the neck and left arm. She has taken hydrocodone with improvement (8:30 last dose). She describes a shooting pain which is exacerbated by movement. She rates the pain 7/10. The headache is occipital in nature with radiation to the frontal area. She reports pressure behind her eyes. She denies vision changes. The headache is worse with standing and improves with lying flat, but lying flat exacerbates the back pain. She also reports swelling of the feet/legs which is worse on the right. She denies fever, chills, URI symptoms, neck stiffness, chest pain, cough, SOB, nausea/vomiting, dysuria, focal weakness or numbness. She reports appropriate post operative incisional pain and scant vaginal bleeding. Denies smoking, exogenous hormones, or prior hx of VTE.        Review of patient's allergies indicates:  No Known Allergies  Past Medical History:   Diagnosis Date    Gestational hypertension      Past Surgical History:   Procedure Laterality Date     SECTION N/A 2020    Procedure:  SECTION;  Surgeon: Heather Briggs MD;  Location: [facility];  Service: OB/GYN;  Laterality: N/A;    DILATION AND CURETTAGE OF UTERUS  2017     Family History   Problem Relation Age of Onset    Diabetes Paternal Grandfather     Diabetes Paternal Grandmother     Diabetes Maternal Grandfather      Heart attack Maternal Grandfather         X2,  from heart issues    No Known Problems Mother     No Known Problems Father     No Known Problems Brother     No Known Problems Maternal Grandmother      Social History     Tobacco Use    Smoking status: Never Smoker    Smokeless tobacco: Never Used   Substance Use Topics    Alcohol use: Not Currently     Frequency: Never     Comment: ocassionally    Drug use: Never     Review of Systems   Constitutional: Negative for chills and fever.   HENT: Negative for sore throat.    Eyes: Negative for visual disturbance.   Respiratory: Negative for shortness of breath.    Cardiovascular: Negative for chest pain.   Gastrointestinal: Negative for nausea and vomiting.   Genitourinary: Negative for dysuria.   Musculoskeletal: Positive for back pain and neck pain.   Skin: Positive for wound. Negative for rash.   Neurological: Positive for headaches. Negative for dizziness and weakness.   Hematological: Does not bruise/bleed easily.       Physical Exam     Initial Vitals [20 1002]   BP Pulse Resp Temp SpO2   132/80 100 18 98.5 °F (36.9 °C) 97 %      MAP       --         Physical Exam    Constitutional: She appears well-developed and well-nourished. She is Obese . No distress.   HENT:   Head: Atraumatic.   Eyes: Conjunctivae and EOM are normal. Pupils are equal, round, and reactive to light.   Neck: Normal range of motion and full passive range of motion without pain. Neck supple. No neck rigidity.   Cardiovascular: Normal rate, regular rhythm, normal heart sounds and intact distal pulses.   Bilateral nonpitting edema (R>L)  +Claire's sign on the right   Pulmonary/Chest: Breath sounds normal. No respiratory distress. She has no wheezes. She has no rhonchi. She has no rales.   Abdominal: Soft. Bowel sounds are normal. There is no abdominal tenderness.   Low transverse  section incision clean, dry, intact without surrounding erythema or drainage.    Musculoskeletal:      Lumbar back: She exhibits tenderness and bony tenderness (midline lumbar spine, bilateral SI joint). She exhibits normal range of motion.   Neurological: She is alert and oriented to person, place, and time. She has normal strength and normal reflexes. No cranial nerve deficit or sensory deficit. Coordination and gait normal.   Skin: Skin is warm and dry. Bruising and rash noted.              ED Course   Procedures  Labs Reviewed   CBC W/ AUTO DIFFERENTIAL - Abnormal; Notable for the following components:       Result Value    RBC 3.29 (*)     Hemoglobin 9.5 (*)     Hematocrit 30.4 (*)     Mean Corpuscular Hemoglobin Conc 31.3 (*)     Platelets 436 (*)     All other components within normal limits   COMPREHENSIVE METABOLIC PANEL - Abnormal; Notable for the following components:    Chloride 111 (*)     CO2 20 (*)     Albumin 2.2 (*)     Alkaline Phosphatase 197 (*)     AST 43 (*)     ALT 64 (*)     All other components within normal limits   B-TYPE NATRIURETIC PEPTIDE          Imaging Results          US Lower Extremity Veins Bilateral (Final result)  Result time 09/21/20 15:46:19    Final result by Maia Becerra MD (09/21/20 15:46:19)                 Impression:      No evidence of deep venous thrombosis in either lower extremity.    Electronically signed by resident: Ulises Richey  Date:    09/21/2020  Time:    15:29    Electronically signed by: Maia Becerra MD  Date:    09/21/2020  Time:    15:46             Narrative:    EXAMINATION:  US LOWER EXTREMITY VEINS BILATERAL    CLINICAL HISTORY:  Other specified soft tissue disorders    TECHNIQUE:  Duplex and color flow Doppler and dynamic compression was performed of the bilateral lower extremity veins was performed.    COMPARISON:  No priors.    FINDINGS:  Right thigh veins: The common femoral, femoral, popliteal, upper greater saphenous, and deep femoral veins are patent and free of thrombus. The veins are normally compressible  and have normal phasic flow and augmentation response.    Right calf veins: The visualized calf veins are patent.    Left thigh veins: The common femoral, femoral, popliteal, upper greater saphenous, and deep femoral veins are patent and free of thrombus. The veins are normally compressible and have normal phasic flow and augmentation response.    Left calf veins: The visualized calf veins are patent.                               MRI Lumbar Spine Without Contrast (Final result)  Result time 09/21/20 13:56:42    Final result by Zachary Herron MD (09/21/20 13:56:42)                 Impression:      No evidence for focal protrusion or extrusion of disc material.  Spinal canal dimensions are unremarkable.      Electronically signed by: Zachary Herron MD  Date:    09/21/2020  Time:    13:56             Narrative:    EXAMINATION:  MRI LUMBAR SPINE WITHOUT CONTRAST    CLINICAL HISTORY:  Back pain or radiculopathy, prior surgery, new symptoms;s/p epidrual, evaluate for abscess/hematoma;    TECHNIQUE:  Multiplanar, multisequence MR images were acquired from the thoracolumbar junction to the sacrum without the administration of contrast.    COMPARISON:  None.    FINDINGS:  Alignment: Normal.    Vertebrae:  5 lumbar-type vertebral bodies. No evidence for marrow abnormality apart from low signal intensity likely red marrow hyperplasia.  No fracture.    Discs: Normal height and signal.    Cord: Normal.  Conus terminates at L1    Degenerative findings:    T12-L1: No significant central canal or neural foraminal narrowing.    L1-L2: No significant central canal or neural foraminal narrowing.    L2-L3: No significant central canal or neural foraminal narrowing.    L3-L4: No significant central canal or neural foraminal narrowing.    L4-L5: No significant central canal or neural foraminal narrowing.    L5-S1: No significant central canal or neural foraminal narrowing.    Paraspinal muscles & soft tissues: Edema identified  paraspinal soft tissues L1-L3 level..                               MRV Brain Without Contrast (Final result)  Result time 09/21/20 13:46:07   Procedure changed from MRV Brain With & W/O Contrast     Final result by Fuad Tillman DO (09/21/20 13:46:07)                 Impression:      MRV: Unremarkable MRV of the head as detailed above specifically without evidence for dural venous sinus thrombosis.    Clinical correlation and further evaluation as warranted.      Electronically signed by: Fuad Tillman DO  Date:    09/21/2020  Time:    13:46             Narrative:    EXAMINATION:  MRV BRAIN WITHOUT CONTRAST    CLINICAL HISTORY:  Dural venous sinus thrombosis suspected;    TECHNIQUE:  noncontrast 3-D time of flight MRA head and seperate aquisition a noncontrast 2 -D time-of-flight MRV head.  3 dimensional MIP reformations  performed.    COMPARISON:  Arm    FINDINGS:  MRV head:    The superior sagittal sinus is patent.  The inferior sagittal sinus is hypoplastic.  The bilateral transverse and sigmoid dural venous sinuses are patent.    The paired internal cerebral veins, lateral atrial veins are patent.  The left basal vein of Lucas is hypoplastic.  The torcula herophili  and straight sinus are patent.                                 Medical Decision Making:   History:   Old Medical Records: I decided to obtain old medical records.  Old Records Summarized: records from previous admission(s).  Clinical Tests:   Lab Tests: Ordered and Reviewed  Radiological Study: Ordered and Reviewed  Medical Tests: Reviewed  Other:   I have discussed this case with another health care provider.       APC / Resident Notes:   24 y.o. year old female presenting with back pain and headache s/p epidural.    DDx includes but is not limited to spinal headache, epidural abscess, hematoma, dural venous sinus thrombosis, DVT.    ED course  MRV Manuel unremarkable  MRI lumbar spine unremarkable  DVT study negative  Repeat labs with  persistent transaminitis, alk phos 197, normal t bili  H&H 9.5    Plan  Anesthesia evaluated the patient at the bedside, they are not recommending a blood patch and expect resolution of symptoms within 2-4 days  Patient is comfortable with this plan, does not desire another procedure and feels that her pain is manageable with current pain regimen  Will send home with rx fioricet, tylenol precautions discussed  PCP f/u in 1 week for repeat LFTs    Discussed findings and plan with patient who verbalized understanding and agrees with the plan and course of treatment. Return to ED precautions discussed. Patient is stable for discharge. I discussed the care of this patient with my supervising physician.                          Clinical Impression:     ICD-10-CM ICD-9-CM   1. Back pain, unspecified back location, unspecified back pain laterality, unspecified chronicity  M54.9 724.5   2. Leg swelling  M79.89 729.81   3. Nonintractable headache, unspecified chronicity pattern, unspecified headache type  R51 784.0   4. S/P  section  Z98.891 V45.89   5. Abnormal LFTs (liver function tests)  R94.5 790.6                      Disposition:   Disposition: Discharged  Condition: Stable     ED Disposition Condition    Discharge Stable        ED Prescriptions     Medication Sig Dispense Start Date End Date Auth. Provider    butalbital-acetaminophen-caffeine -40 mg (FIORICET, ESGIC) -40 mg per tablet Take 1 tablet by mouth every 4 (four) hours as needed for Headaches. 20 tablet 2020 10/21/2020 Alena Ocampo PA-C        Follow-up Information     Follow up With Specialties Details Why Contact Info Additional Information    [facility], [place] [address] Outpatient Rehab Schedule an appointment as soon as possible for a visit in 1 week  [address] [address] [phone] OB Gyn Physical Therapy - [facility], 4th Floor  Please park in [place] and  use [place] elevators                                       Alena Ocampo PA-C  09/21/20 [phone]

## 2020-09-22 NOTE — PROGRESS NOTES
CC:    HPI: Pt is a 24 y.o.  female who presents for PP BP check. Pt delivered via c/s on 9/15 @ 37w0d. Pregnancy was complicated by fetal intolerance of labor. She was told to have a 1 week BP check. Pt denies any HA, blurry vision, epigastric pain, SOB, chest pain, swelling has subsided. BP today was 124/70. No other complaints or concerns noted.       ROS:  GENERAL: Feeling well overall. Denies fever or chills.   SKIN: Denies rash or lesions.   HEAD: Denies head injury or headache.   NODES: Denies enlarged lymph nodes.   CHEST: Denies chest pain or shortness of breath.   CARDIOVASCULAR: Denies palpitations or left sided chest pain.   ABDOMEN: No abdominal pain, constipation, diarrhea, nausea, vomiting or rectal bleeding.   URINARY: No dysuria, hematuria, or burning on urination.  REPRODUCTIVE: See HPI.   BREASTS: Denies pain, lumps, or nipple discharge.       PE:   APPEARANCE: Well nourished, well developed, White female in no acute distress.  VULVA: No lesions. Normal external female genitalia.  URETHRAL MEATUS: Normal size and location, no lesions, no prolapse.  URETHRA: No masses, tenderness, or prolapse.  VAGINA: Moist. No lesions or lacerations noted. No abnormal discharge present. No odor present.   CERVIX: No lesions or discharge. No cervical motion tenderness.   UTERUS: Normal size, regular shape, mobile, non-tender.  ADNEXA: No tenderness. No fullness or masses palpated in the adnexal regions.   ANUS PERINEUM: Normal.      Diagnosis:  1. Postpartum care and examination    2. BP check        Plan:   Follow up for 6 week post partum visit.

## 2020-09-22 NOTE — ADDENDUM NOTE
Addendum  created 09/22/20 1022 by Bartolome Christopher MD    Charge Capture section accepted

## 2020-09-23 ENCOUNTER — POSTPARTUM VISIT (OUTPATIENT)
Dept: OBSTETRICS AND GYNECOLOGY | Facility: CLINIC | Age: 25
End: 2020-09-23
Payer: MEDICAID

## 2020-09-23 VITALS
DIASTOLIC BLOOD PRESSURE: 70 MMHG | WEIGHT: 257.94 LBS | SYSTOLIC BLOOD PRESSURE: 124 MMHG | BODY MASS INDEX: 45.7 KG/M2 | HEIGHT: 63 IN

## 2020-09-23 DIAGNOSIS — Z01.30 BP CHECK: ICD-10-CM

## 2020-09-23 PROCEDURE — 99999 PR PBB SHADOW E&M-EST. PATIENT-LVL III: CPT | Mod: PBBFAC,,,

## 2020-09-23 PROCEDURE — 99213 OFFICE O/P EST LOW 20 MIN: CPT | Mod: PBBFAC

## 2020-09-23 PROCEDURE — 59430 PR CARE AFTER DELIVERY ONLY: ICD-10-PCS | Mod: ,,, | Performed by: OBSTETRICS & GYNECOLOGY

## 2020-09-23 PROCEDURE — 99999 PR PBB SHADOW E&M-EST. PATIENT-LVL III: ICD-10-PCS | Mod: PBBFAC,,,

## 2020-11-06 ENCOUNTER — POSTPARTUM VISIT (OUTPATIENT)
Dept: OBSTETRICS AND GYNECOLOGY | Facility: CLINIC | Age: 25
End: 2020-11-06
Payer: MEDICAID

## 2020-11-06 VITALS
WEIGHT: 243.63 LBS | SYSTOLIC BLOOD PRESSURE: 104 MMHG | DIASTOLIC BLOOD PRESSURE: 64 MMHG | HEIGHT: 62 IN | BODY MASS INDEX: 44.83 KG/M2

## 2020-11-06 DIAGNOSIS — F41.8 POSTPARTUM ANXIETY: Primary | ICD-10-CM

## 2020-11-06 PROCEDURE — 0503F POSTPARTUM CARE VISIT: CPT | Mod: S$PBB,,, | Performed by: OBSTETRICS & GYNECOLOGY

## 2020-11-06 PROCEDURE — 0503F PR POSTPARTUM CARE VISIT: ICD-10-PCS | Mod: S$PBB,,, | Performed by: OBSTETRICS & GYNECOLOGY

## 2020-11-06 PROCEDURE — 99999 PR PBB SHADOW E&M-EST. PATIENT-LVL III: CPT | Mod: PBBFAC,,, | Performed by: OBSTETRICS & GYNECOLOGY

## 2020-11-06 PROCEDURE — 99999 PR PBB SHADOW E&M-EST. PATIENT-LVL III: ICD-10-PCS | Mod: PBBFAC,,, | Performed by: OBSTETRICS & GYNECOLOGY

## 2020-11-06 PROCEDURE — 99213 OFFICE O/P EST LOW 20 MIN: CPT | Mod: PBBFAC,TH | Performed by: OBSTETRICS & GYNECOLOGY

## 2020-11-06 RX ORDER — SERTRALINE HYDROCHLORIDE 25 MG/1
25 TABLET, FILM COATED ORAL DAILY
Qty: 30 TABLET | Refills: 11 | Status: SHIPPED | OUTPATIENT
Start: 2020-11-06 | End: 2023-12-17 | Stop reason: SDUPTHER

## 2020-11-06 NOTE — PROGRESS NOTES
Subjective:       Patient ID: Elo Hays is a 25 y.o. female.    Chief Complaint:  Post-op Evaluation      History of Present Illness  HPI  24 y/o  presents for evaluation of postpartum anxiety. She presents with her mother and baby. She is 7 weeks postpartum s/p 1LTCS. She had been followed by CNM group during pregnancy. Incision healing well. Reports still having back pain at the epidural sites (had two separate epidural procedures).   No anxiety or depression history. Few crying spells but mostly anxiety. Denies SI/HI.    GYN & OB History  Patient's last menstrual period was 10/29/2020.   Date of Last Pap: No result found    OB History    Para Term  AB Living   2 1 1   1 1   SAB TAB Ectopic Multiple Live Births   1     0 1      # Outcome Date GA Lbr Ish/2nd Weight Sex Delivery Anes PTL Lv   2 Term 20 37w0d  3.147 kg (6 lb 15 oz) M CS-LTranv EPI N JOEY   1 2017              Complications: History of D&C       Review of Systems  Review of Systems   Constitutional: Negative for chills, diaphoresis, fatigue and fever.   Respiratory: Negative for cough and shortness of breath.    Cardiovascular: Negative for chest pain and palpitations.   Gastrointestinal: Negative for abdominal pain, constipation, diarrhea, nausea and vomiting.   Genitourinary: Negative for dysmenorrhea, dyspareunia, dysuria, flank pain, menstrual problem, pelvic pain, vaginal bleeding, vaginal discharge and vaginal pain.   Musculoskeletal: Positive for back pain.   Integumentary:  Negative for breast mass, nipple discharge and breast skin changes.   Neurological: Negative for headaches.   Psychiatric/Behavioral: Positive for depression and sleep disturbance. The patient is nervous/anxious.    Breast: Negative for mass, mastodynia, nipple discharge and skin changes          Objective:    Physical Exam:   Constitutional: She is oriented to person, place, and time. She appears well-developed and well-nourished.  No distress.    HENT:   Head: Normocephalic and atraumatic.     Neck: Normal range of motion.     Pulmonary/Chest: Effort normal.        Abdominal: Soft. She exhibits abdominal incision (healing well, no induration erythema or drainage). She exhibits no mass. There is no abdominal tenderness. There is no rebound and no guarding. No hernia.     Genitourinary:    Genitourinary Comments: Deferred               Musculoskeletal: Normal range of motion and moves all extremeties.       Neurological: She is alert and oriented to person, place, and time.    Skin: No rash noted.    Psychiatric: She has a normal mood and affect. Her behavior is normal. Judgment and thought content normal.          Assessment:        1. Postpartum anxiety             Plan:      Elo was seen today for post-op evaluation.    Diagnoses and all orders for this visit:    Postpartum anxiety    Other orders  -     sertraline (ZOLOFT) 25 MG tablet; Take 1 tablet (25 mg total) by mouth once daily.  Counseling resources given  Mom support group list given  Will start zoloft 25 mg and increase as needed  Precautions reviewed    No orders of the defined types were placed in this encounter.      Follow up in about 2 weeks (around 11/20/2020) for followup.

## 2020-11-06 NOTE — PATIENT INSTRUCTIONS
Psychology resources Phone accepts medicaid?                  [facility] [phone] Y   [facility]  [phone] Y   [facility] [phone]    [facility] [address] [phone]     [facility] [phone]    [facility] [place] human services [facility] [phone] Y   Christine Natalia [phone]    Verónica Bell [phone]    [facility]  [phone] Y   [facility]  [phone]    [employer] [email or web address]

## 2020-12-21 PROBLEM — O13.3 GESTATIONAL HYPERTENSION, THIRD TRIMESTER: Status: RESOLVED | Noted: 2020-09-15 | Resolved: 2020-12-21

## 2021-02-19 ENCOUNTER — OFFICE VISIT (OUTPATIENT)
Dept: URGENT CARE | Facility: CLINIC | Age: 26
End: 2021-02-19
Payer: MEDICAID

## 2021-02-19 VITALS
TEMPERATURE: 99 F | SYSTOLIC BLOOD PRESSURE: 121 MMHG | RESPIRATION RATE: 20 BRPM | DIASTOLIC BLOOD PRESSURE: 79 MMHG | OXYGEN SATURATION: 98 % | HEART RATE: 70 BPM | WEIGHT: 230 LBS | BODY MASS INDEX: 40.75 KG/M2 | HEIGHT: 63 IN

## 2021-02-19 DIAGNOSIS — B08.4 HAND, FOOT AND MOUTH DISEASE (HFMD): Primary | ICD-10-CM

## 2021-02-19 PROCEDURE — 99203 PR OFFICE/OUTPT VISIT, NEW, LEVL III, 30-44 MIN: ICD-10-PCS | Mod: S$GLB,,, | Performed by: NURSE PRACTITIONER

## 2021-02-19 PROCEDURE — 99203 OFFICE O/P NEW LOW 30 MIN: CPT | Mod: S$GLB,,, | Performed by: NURSE PRACTITIONER

## 2021-04-22 ENCOUNTER — PATIENT MESSAGE (OUTPATIENT)
Dept: UROLOGY | Facility: CLINIC | Age: 26
End: 2021-04-22

## 2023-06-13 NOTE — TELEPHONE ENCOUNTER
Called pt to review elevated one hour glucose screening. No answer. Will also send portal message and order 3 hour test.     Rox  
Anxiety and depression

## 2023-12-18 RX ORDER — SERTRALINE HYDROCHLORIDE 25 MG/1
25 TABLET, FILM COATED ORAL DAILY
Qty: 30 TABLET | Refills: 11 | Status: SHIPPED | OUTPATIENT
Start: 2023-12-18 | End: 2024-01-21 | Stop reason: SDUPTHER

## 2024-01-21 RX ORDER — SERTRALINE HYDROCHLORIDE 25 MG/1
25 TABLET, FILM COATED ORAL DAILY
Qty: 30 TABLET | Refills: 11 | Status: SHIPPED | OUTPATIENT
Start: 2024-01-21 | End: 2025-01-20